# Patient Record
Sex: FEMALE | Race: BLACK OR AFRICAN AMERICAN | Employment: FULL TIME | URBAN - METROPOLITAN AREA
[De-identification: names, ages, dates, MRNs, and addresses within clinical notes are randomized per-mention and may not be internally consistent; named-entity substitution may affect disease eponyms.]

---

## 2017-10-12 LAB
CHLAMYDIA, EXTERNAL: NORMAL
HBSAG, EXTERNAL: NEGATIVE
HBSAG, EXTERNAL: NORMAL
HIV, EXTERNAL: NON REACTIVE
N. GONORRHEA, EXTERNAL: NORMAL
RUBELLA, EXTERNAL: NORMAL
TYPE, ABO & RH, EXTERNAL: NORMAL

## 2017-10-19 LAB
ANTIBODY SCREEN, EXTERNAL: NORMAL
T. PALLIDUM, EXTERNAL: NORMAL

## 2017-11-13 LAB — GRBS, EXTERNAL: NORMAL

## 2017-11-26 ENCOUNTER — HOSPITAL ENCOUNTER (EMERGENCY)
Age: 27
Discharge: HOME | End: 2017-11-26
Attending: OBSTETRICS & GYNECOLOGY | Admitting: OBSTETRICS & GYNECOLOGY
Payer: COMMERCIAL

## 2017-11-26 VITALS
HEART RATE: 119 BPM | BODY MASS INDEX: 38.64 KG/M2 | TEMPERATURE: 99 F | WEIGHT: 210 LBS | DIASTOLIC BLOOD PRESSURE: 67 MMHG | HEIGHT: 62 IN | SYSTOLIC BLOOD PRESSURE: 112 MMHG | RESPIRATION RATE: 16 BRPM | OXYGEN SATURATION: 99 %

## 2017-11-26 PROCEDURE — 59025 FETAL NON-STRESS TEST: CPT

## 2017-11-26 PROCEDURE — 99283 EMERGENCY DEPT VISIT LOW MDM: CPT

## 2017-11-26 NOTE — H&P
EDC:12/10/2017  EGA: 38 weeks, 0 days      Chief Complaint:  contractions. History of Present Illness:  29 y/o  @ 38/0wks (by 3rd trimester US) presents to L&D c/o ctx Q30 minutes. No associated LOF or VB. Excellent FM. Pregnancy complicated by late presentation to care (31wks), class 3 obesity (BMI 41), LOV cyst 7cm, GBS(+)      Patient's Prenatal Care with Doctor of Record Brigid Brunner MD Notable For -    GBS positive RX in labor  Ovarian cyst pregnant LEFT-7cm  High risk pregnancy insufficient PNC-dated by 31 week US   lab screening  Normal pregnancy primigravida          Impression & Recommendations:    Problem # 1:  Labor false > 37 weeks (HDP-373.86) (HWX23-K68.1)  No evidence of labor. Labor precautions reviewed. Indications for return reviewed. Problem # 2:  GBS positive RX in labor (OGP-354.44) (BTQ83-E22.20)  Plan PCN G for prophylaxis in labor. Other Orders:  Sent to L&D (CPT-AdmitF)          Past Medical History:     Reviewed history from 2017 and no changes required:        LSIL 2016, cx bx- neg    Past Surgical History:     Reviewed history from 2013 and no changes required:        wisdom teeth     Family History Summary:      Reviewed history Last on 10/12/2017 and no changes required:2017      General Comments - FH:  Family history transferred to [address] [place] [address]     Social History:     Reviewed history from 2013 and no changes required:        [address] @ [facility]        See HPI    Except as noted in the HPI, the review of systems is negative for General, Breast, , CV, Resp, Endo, MS, Derm, Neuro, Psych, Allergy and Heme.     Allergies      Medications Removed from Medication List        Flowsheet View for Follow-up Visit     Estimated weeks of        gestation:  38 0/7     Weight:  230.6     FHR:   130     Fetal position:  vertex     Cx Dilation:  0     Cx Effacement: 50% Cx Station:  -3     Preceptor:  yonis     Comment:  triage visit false labor, reactive NST, labor precautions reviewed      Vital Signs    Visit Vitals    /67    Pulse (!) 119    Temp 99 °F (37.2 °C)    Resp 16    Ht 5' 2\" (1.575 m)    Wt 95.3 kg (210 lb)    SpO2 99%    BMI 38.41 kg/m2         Height:   63 inches  Weight:  230.6 pounds    BMI:   41.00 inches    Physical Exam     General           General appearance:  no acute distress    Head           Inspection:   normal    Eyes           External:   EOM intact    ENT           Dental:   adequate dentition    Chest           Lungs:  clear to auscultation          Heart:  regular rate and rhythm    Extremeties           Extremeties:  0 edema    Neurological           Reflexes:  2+ and symmetric with no pathological reflexes    Psych           Orientation:  oriented to time, place, and person          Mood:  no appearance of anxiety, depression, or agitation    Lymph           Inguinal:  no inguinal adenopathy    Skin           Inspection:  no rashes, suspicious lesions, or ulcerations    Abdomen           Abdomen:  gravid    Pelvic Exam           EGBUS:  no lesions          Vagina:  normal appearing without lesions or discharge          Uterus:  gravid          Cervix:  no lesions or discharge                  Dilation: : 0                  Effacement:  50%                  Station:  -3                  Presentation:  vertex                  Membranes:  intact    Agricola: rare ctx  FHR: 130s, category 1        Impression & Recommendations:    Problem # 1:  Labor false > 37 weeks (ICD-644.13) (VHE40-X00.1)  No evidence of labor. Labor precautions reviewed. Indications for return reviewed. Problem # 2:  GBS positive RX in labor (QRM-449.47) (JPG95-J45.24)  Plan PCN G for prophylaxis in labor.      Other Orders:  Sent to L&D (CPT-AdmitF)      Medications (at conclusion of this visit)    11/06/2017 SLOW RELEASE IRON TABLET EXTENDED RELEASE (FERROUS SULFATE DRIED CR-TABS)   11/06/2017 PRENATAL TABLET (PRENATAL VIT-FE FUMARATE-FA TABS)           LABORATORY DATA   TEST DATE RESULT   Group B Strep culture 11/13/2017 Positive                                   (Group B Strep Culture Result Field)   Blood Type 10/12/2017 B                                             (Blood Type Result Field)   Rh 10/12/2017 Positive                                   (Rh Result Field)   Rhogam Inj Given     Tdap Vaccine Given 11/06/2017 vacc. elsewhere   Antibody Screen 10/19/2017 Negative   Rubella  [facility] Reference Ranges On or After 3/10/14                  <0.90              Non-immune      0.90 - 0.99     Equivocal      >0.99              Immune    [facility] Reference Ranges  Before 3/10/14           <5                 Non-immune             5 - 9               Equivocal            >9                 Immune  [facility] Reference Ranges       < Or = 0.90       Negative             0.91-1.09          Equivocal            > Or = 1.10       Positive   10/12/2017     7.52     TPA (T Pallidum Antibodies) 10/19/2017 Negative   Serology (RPR)     HBsAg 10/12/2017 Negative   HIV 10/12/2017 Non Reactive   Hemoglobin 10/19/2017 10.9   Hematocrit 10/19/2017 33.1   Platelets 82/63/4101 251 X10E3/UL   TSH     Urine Culture 10/12/2017 Negative   GC DNA Probe 10/12/2017 Negative   Chlamydia DNA 10/12/2017 Negative   PAP 10/12/2017 NIL   Flu Vaccine Given 10/12/2017 vacc.  elsewhere   HGBA1C     HGB Electro     T4, Free     BG Fasting     GTT 1H 50G 10/19/2017 130   GTT 1H 100G     GTT 2H 100G     GTT 3H 100G     Glucose Plasma     CF Accept or Decline 10/12/2017 Declined   CF Screen Result 10/12/2017 Declined   Nuchal Trans 10/12/2017 declined   AFP Only     Tetra     AFP Serum 10/12/2017 declined   CVS 10/12/2017 declined   AFP Amniotic     Amnio Karyo 10/12/2017 declined   FISH     GC Culture     Chlamydia Cult     Ureaplasma     Mycoplasma     WBC 10/12/2017 10.9 X10E3/UL   RBC 10/12/2017 4.26 X10E6/UL   MCV 10/12/2017 79   MCH 10/12/2017 27.0   MCHC RBC 10/12/2017 34.0     ULTRASOUND DATA   TEST DATE RESULT   Estimated Fetal Weight 11/13/2017 2622^7608 g&grams                                     Weight % 11/13/2017 74^74% %&percent                                                VISHNU 11/13/2017 15.77^15.8 cm&centimeters                    BPP 11/13/2017 8^8 [n/a]&Not applicable   Cervical Length (mm)             Electronically signed by Bj Haas MD on 11/26/2017 at 4:54 PM    ________________________________________________________________________    Date of Visit Update:  Jun Polanco was seen and examined. History and physical has been reviewed. The patient has been examined.  There have been no significant clinical changes since the completion of the originally dated History and Physical.    Signed By: Bj Haas MD     November 26, 2017 4:54 PM

## 2017-11-26 NOTE — DISCHARGE INSTRUCTIONS
General Discharge Instructions    Patient ID:  Shailesh Parks  [id number]  28 y.o.  1990    Patient Instructions    Take Home Medications         What to do at Home    Recommended diet: Regular Diet    Recommended activity: Activity as tolerated    Follow-up: Keep scheduled appointment         Week 45 of Your Pregnancy: Care Instructions  Your Care Instructions    Believe it or not, your baby is almost here. You may have ideas about your baby's personality because of how much he or she moves. Or you may have noticed how he or she responds to sounds, warmth, cold, and light. You may even know what kind of music your baby likes. By now, you have a better idea of what to expect during delivery. You may have talked about your birth preferences with your doctor. But even if you want a vaginal birth, it is a good idea to learn about  births.  birth means that your baby is born through a cut (incision) in your lower belly. It is sometimes the best choice for the health of the baby and the mother. This care sheet can help you understand  births. It also gives you information about what to expect after your baby is born. And it helps you understand more about postpartum depression. Follow-up care is a key part of your treatment and safety. Be sure to make and go to all appointments, and call your doctor if you are having problems. It's also a good idea to know your test results and keep a list of the medicines you take. How can you care for yourself at home? Learn about  birth  · Most C-sections are unplanned. They are done because of problems that occur during labor. These problems might include:  ¨ Labor that slows or stops. ¨ High blood pressure or other problems for the mother. ¨ Signs of distress in the baby. These signs may include a very fast or slow heart rate. · Although most mothers and babies do well after , it is major surgery.  It has more risks than a vaginal delivery. · In some cases, a planned  may be safer than a vaginal delivery. This may be the case if:  ¨ The mother has a health problem, such as a heart condition. ¨ The baby isn't in a head-down position for delivery. This is called a breech position. ¨ The uterus has scars from past surgeries. This could increase the chance of a tear in the uterus. ¨ There is a problem with the placenta. ¨ The mother has an infection, such as genital herpes, that could be spread to the baby. ¨ The mother is having twins or more. ¨ The baby weighs 9 to 10 pounds or more. · Because of the risks of , planned C-sections generally should be done only for medical reasons. And a planned  should be done at 39 weeks or later unless there is a medical reason to do it sooner. Know what to expect after delivery, and plan for the first few weeks at home  · You, your baby, and your partner or  will get identification bands. Only people with matching bands can  the baby from the nursery. · You will learn how to feed, diaper, and bathe your baby. And you will learn how to care for the umbilical cord stump. If your baby will be circumcised, you will also learn how to care for that. · Ask people to wait to visit you until you are at home. And ask them to wash their hands before they touch your baby. · Make sure you have another adult in your home for at least 2 or 3 days after the birth. · During the first 2 weeks, limit when friends and family can visit. · Do not allow visitors who have colds or infections. Make sure all visitors are up to date with their vaccinations. Never let anyone smoke around your baby. · Try to nap when the baby naps. Be aware of postpartum depression  · \"Baby blues\" are common for the first 1 to 2 weeks after birth. You may cry or feel sad or irritable for no reason. · For some women, these feelings last longer and are more intense.  This is called postpartum depression. · If your symptoms last for more than a few weeks or you feel very depressed, ask your doctor for help. · Postpartum depression can be treated. Support groups and counseling can help. Sometimes medicine can also help. Where can you learn more? Go to http://bimal-johny.info/. Enter B044 in the search box to learn more about \"Week 38 of Your Pregnancy: Care Instructions. \"  Current as of: March 16, 2017  Content Version: 11.4  © 4925-2984 [employer]. Care instructions adapted under license by [employer] (which disclaims liability or warranty for this information). If you have questions about a medical condition or this instruction, always ask your healthcare professional. [address] any warranty or liability for your use of this information.

## 2017-11-26 NOTE — PROGRESS NOTES
1642: Dr. Janene Tineo in room, SVE done. 11/26/17 1642   Cervical Exam   Dilation (cm) 0     Will discharge pt home.

## 2017-11-26 NOTE — ROUTINE PROCESS
[facility] Nov 30th, Pt in c/o contractions now every 10-15 mins apart. Denies leaking, denies bleeding.  Gr 1

## 2017-11-26 NOTE — IP AVS SNAPSHOT
[address] [facility] 
[phone] Patient: Rafael Navarro MRN: [id number] XCT:[id number] About your hospitalization You were admitted on:  N/A You last received care in the:  [facility] 3 LD TRIAGE You were discharged on:  2017 Why you were hospitalized Your primary diagnosis was:  Not on File Discharge Orders None A check geo indicates which time of day the medication should be taken. My Medications ASK your physician about these medications Instructions Each Dose to Equal  
 Morning Noon Evening Bedtime PNV No12-Iron-FA-DSS-OM-3 29 mg iron-1 mg -50 mg Cpkd Your last dose was: Your next dose is: Take  by mouth. SLOW  mg (45 mg iron) ER tablet Generic drug:  ferrous sulfate Your last dose was: Your next dose is: Take  by mouth Daily (before breakfast). Discharge Instructions General Discharge Instructions Patient ID: 
Rafael Navarro 
[id number] 
89 y.o. 
1990 Patient Instructions Take Home Medications What to do at [facility] Recommended diet: Regular Diet Recommended activity: Activity as tolerated Follow-up: Keep scheduled appointment Week 38 of Your Pregnancy: Care Instructions Your Care Instructions Believe it or not, your baby is almost here. You may have ideas about your baby's personality because of how much he or she moves. Or you may have noticed how he or she responds to sounds, warmth, cold, and light. You may even know what kind of music your baby likes. By now, you have a better idea of what to expect during delivery. You may have talked about your birth preferences with your doctor. But even if you want a vaginal birth, it is a good idea to learn about  births.  birth means that your baby is born through a cut (incision) in your lower belly. It is sometimes the best choice for the health of the baby and the mother. This care sheet can help you understand  births. It also gives you information about what to expect after your baby is born. And it helps you understand more about postpartum depression. Follow-up care is a key part of your treatment and safety. Be sure to make and go to all appointments, and call your doctor if you are having problems. It's also a good idea to know your test results and keep a list of the medicines you take. How can you care for yourself at home? Learn about  birth · Most C-sections are unplanned. They are done because of problems that occur during labor. These problems might include: 
¨ Labor that slows or stops. ¨ High blood pressure or other problems for the mother. ¨ Signs of distress in the baby. These signs may include a very fast or slow heart rate. · Although most mothers and babies do well after , it is major surgery. It has more risks than a vaginal delivery. · In some cases, a planned  may be safer than a vaginal delivery. This may be the case if: ¨ The mother has a health problem, such as a heart condition. ¨ The baby isn't in a head-down position for delivery. This is called a breech position. ¨ The uterus has scars from past surgeries. This could increase the chance of a tear in the uterus. ¨ There is a problem with the placenta. ¨ The mother has an infection, such as genital herpes, that could be spread to the baby. ¨ The mother is having twins or more. ¨ The baby weighs 9 to 10 pounds or more. · Because of the risks of , planned C-sections generally should be done only for medical reasons. And a planned  should be done at 39 weeks or later unless there is a medical reason to do it sooner. Know what to expect after delivery, and plan for the first few weeks at home · You, your baby, and your partner or  will get identification bands. Only people with matching bands can  the baby from the nursery. · You will learn how to feed, diaper, and bathe your baby. And you will learn how to care for the umbilical cord stump. If your baby will be circumcised, you will also learn how to care for that. · Ask people to wait to visit you until you are at home. And ask them to wash their hands before they touch your baby. · Make sure you have another adult in your home for at least 2 or 3 days after the birth. · During the first 2 weeks, limit when friends and family can visit. · Do not allow visitors who have colds or infections. Make sure all visitors are up to date with their vaccinations. Never let anyone smoke around your baby. · Try to nap when the baby naps. Be aware of postpartum depression · \"Baby blues\" are common for the first 1 to 2 weeks after birth. You may cry or feel sad or irritable for no reason. · For some women, these feelings last longer and are more intense. This is called postpartum depression. · If your symptoms last for more than a few weeks or you feel very depressed, ask your doctor for help. · Postpartum depression can be treated. Support groups and counseling can help. Sometimes medicine can also help. Where can you learn more? Go to http://bimal-johny.info/. Enter B044 in the search box to learn more about \"Week 38 of Your Pregnancy: Care Instructions. \" Current as of: March 16, 2017 Content Version: 11.4 © 1571-2433 [employer]. Care instructions adapted under license by [employer] (which disclaims liability or warranty for this information).  If you have questions about a medical condition or this instruction, always ask your healthcare professional. [employer] disclaims any warranty or liability for your use of this information. Introducing [facility]! Romayne Duster introduces Easy Vino patient portal. Now you can access parts of your medical record, email your doctor's office, and request medication refills online. 1. In your internet browser, go to [email or web address] 2. Click on the First Time User? Click Here link in the Sign In box. You will see the New Member Sign Up page. 3. Enter your Easy Vino Access Code exactly as it appears below. You will not need to use this code after youve completed the sign-up process. If you do not sign up before the expiration date, you must request a new code. · Easy Vino Access Code: [email or web address] Expires: 2/24/2018  4:48 PM 
 
4. Enter the last four digits of your Social Security Number (xxxx) and Date of Birth (mm/dd/yyyy) as indicated and click Submit. You will be taken to the next sign-up page. 5. Create a Easy Vino ID. This will be your Easy Vino login ID and cannot be changed, so think of one that is secure and easy to remember. 6. Create a Easy Vino password. You can change your password at any time. 7. Enter your Password Reset Question and Answer. This can be used at a later time if you forget your password. 8. Enter your e-mail address. You will receive e-mail notification when new information is available in [address] 9. Click Sign Up. You can now view and download portions of your medical record. 10. Click the Download Summary menu link to download a portable copy of your medical information. If you have questions, please visit the Frequently Asked Questions section of the Easy Vino website. Remember, Easy Vino is NOT to be used for urgent needs. For medical emergencies, dial 911. Now available from your iPhone and Android! Providers Seen During Your Hospitalization Provider Specialty Primary office phone Kenyetta Bailey MD Obstetrics & Gynecology [phone] Your Primary Care Physician (PCP) Primary Care Physician Office Phone Office Fax Deena Sheikh [phone] You are allergic to the following No active allergies Recent Documentation Height Weight BMI OB Status Smoking Status 1.575 m 95.3 kg 38.41 kg/m2 Pregnant Never Smoker Emergency Contacts Name Discharge Info Relation Home Work Mobile [address]al CAREGIVER [3] Parent [1] [phone] Vanna Zapata  Parent [1] [phone] Patient Belongings The following personal items are in your possession at time of discharge: 
                             
 
  
  
 Please provide this summary of care documentation to your next provider. Signatures-by signing, you are acknowledging that this After Visit Summary has been reviewed with you and you have received a copy. Patient Signature:  ____________________________________________________________ Date:  ____________________________________________________________  
  
[facility] Provider Signature:  ____________________________________________________________ Date:  ____________________________________________________________

## 2017-12-14 ENCOUNTER — ANESTHESIA EVENT (OUTPATIENT)
Dept: LABOR AND DELIVERY | Age: 27
End: 2017-12-14
Payer: COMMERCIAL

## 2017-12-14 ENCOUNTER — HOSPITAL ENCOUNTER (INPATIENT)
Age: 27
LOS: 4 days | Discharge: HOME | End: 2017-12-18
Attending: OBSTETRICS & GYNECOLOGY | Admitting: OBSTETRICS & GYNECOLOGY
Payer: COMMERCIAL

## 2017-12-14 ENCOUNTER — ANESTHESIA (OUTPATIENT)
Dept: LABOR AND DELIVERY | Age: 27
End: 2017-12-14
Payer: COMMERCIAL

## 2017-12-14 PROBLEM — O40.3XX0 POLYHYDRAMNIOS AFFECTING PREGNANCY IN THIRD TRIMESTER: Status: ACTIVE | Noted: 2017-12-14

## 2017-12-14 LAB
ERYTHROCYTE [DISTWIDTH] IN BLOOD BY AUTOMATED COUNT: 15.8 % (ref 11.5–14.5)
HCT VFR BLD AUTO: 38.2 % (ref 35–47)
HGB BLD-MCNC: 12.9 G/DL (ref 11.5–16)
MCH RBC QN AUTO: 27.8 PG (ref 26–34)
MCHC RBC AUTO-ENTMCNC: 33.8 G/DL (ref 30–36.5)
MCV RBC AUTO: 82.3 FL (ref 80–99)
PLATELET # BLD AUTO: 243 K/UL (ref 150–400)
RBC # BLD AUTO: 4.64 M/UL (ref 3.8–5.2)
WBC # BLD AUTO: 11.4 K/UL (ref 3.6–11)

## 2017-12-14 PROCEDURE — 75410000002 HC LABOR FEE PER 1 HR

## 2017-12-14 PROCEDURE — 85027 COMPLETE CBC AUTOMATED: CPT | Performed by: OBSTETRICS & GYNECOLOGY

## 2017-12-14 PROCEDURE — 36415 COLL VENOUS BLD VENIPUNCTURE: CPT | Performed by: OBSTETRICS & GYNECOLOGY

## 2017-12-14 PROCEDURE — 76060000078 HC EPIDURAL ANESTHESIA

## 2017-12-14 PROCEDURE — 65410000002 HC RM PRIVATE OB

## 2017-12-14 PROCEDURE — 74011250636 HC RX REV CODE- 250/636: Performed by: OBSTETRICS & GYNECOLOGY

## 2017-12-14 PROCEDURE — 74011000258 HC RX REV CODE- 258: Performed by: OBSTETRICS & GYNECOLOGY

## 2017-12-14 PROCEDURE — 77030034849

## 2017-12-14 PROCEDURE — 77030014125 HC TY EPDRL BBMI -B: Performed by: ANESTHESIOLOGY

## 2017-12-14 PROCEDURE — 00HU33Z INSERTION OF INFUSION DEVICE INTO SPINAL CANAL, PERCUTANEOUS APPROACH: ICD-10-PCS | Performed by: ANESTHESIOLOGY

## 2017-12-14 PROCEDURE — 77030007880 HC KT SPN EPDRL BBMI -B

## 2017-12-14 PROCEDURE — 74011250636 HC RX REV CODE- 250/636

## 2017-12-14 PROCEDURE — 3E033VJ INTRODUCTION OF OTHER HORMONE INTO PERIPHERAL VEIN, PERCUTANEOUS APPROACH: ICD-10-PCS | Performed by: OBSTETRICS & GYNECOLOGY

## 2017-12-14 PROCEDURE — 3E0R3BZ INTRODUCTION OF ANESTHETIC AGENT INTO SPINAL CANAL, PERCUTANEOUS APPROACH: ICD-10-PCS | Performed by: ANESTHESIOLOGY

## 2017-12-14 RX ORDER — NALOXONE HYDROCHLORIDE 0.4 MG/ML
0.4 INJECTION, SOLUTION INTRAMUSCULAR; INTRAVENOUS; SUBCUTANEOUS AS NEEDED
Status: DISCONTINUED | OUTPATIENT
Start: 2017-12-14 | End: 2017-12-15 | Stop reason: HOSPADM

## 2017-12-14 RX ORDER — EPHEDRINE SULFATE 50 MG/ML
12.5 INJECTION, SOLUTION INTRAVENOUS
Status: DISCONTINUED | OUTPATIENT
Start: 2017-12-14 | End: 2017-12-15 | Stop reason: HOSPADM

## 2017-12-14 RX ORDER — SODIUM CHLORIDE 0.9 % (FLUSH) 0.9 %
5-10 SYRINGE (ML) INJECTION EVERY 8 HOURS
Status: DISCONTINUED | OUTPATIENT
Start: 2017-12-14 | End: 2017-12-15 | Stop reason: SDUPTHER

## 2017-12-14 RX ORDER — FENTANYL/BUPIVACAINE/NS/PF 2-1250MCG
1-16 PREFILLED PUMP RESERVOIR EPIDURAL CONTINUOUS
Status: DISCONTINUED | OUTPATIENT
Start: 2017-12-14 | End: 2017-12-15 | Stop reason: HOSPADM

## 2017-12-14 RX ORDER — BUPIVACAINE HYDROCHLORIDE AND EPINEPHRINE 2.5; 5 MG/ML; UG/ML
INJECTION, SOLUTION EPIDURAL; INFILTRATION; INTRACAUDAL; PERINEURAL AS NEEDED
Status: DISCONTINUED | OUTPATIENT
Start: 2017-12-14 | End: 2017-12-15 | Stop reason: HOSPADM

## 2017-12-14 RX ORDER — SODIUM CHLORIDE 0.9 % (FLUSH) 0.9 %
5-10 SYRINGE (ML) INJECTION AS NEEDED
Status: DISCONTINUED | OUTPATIENT
Start: 2017-12-14 | End: 2017-12-15 | Stop reason: SDUPTHER

## 2017-12-14 RX ORDER — NALBUPHINE HYDROCHLORIDE 10 MG/ML
10 INJECTION, SOLUTION INTRAMUSCULAR; INTRAVENOUS; SUBCUTANEOUS
Status: DISCONTINUED | OUTPATIENT
Start: 2017-12-14 | End: 2017-12-15 | Stop reason: HOSPADM

## 2017-12-14 RX ORDER — FENTANYL/BUPIVACAINE/NS/PF 2-1250MCG
PREFILLED PUMP RESERVOIR EPIDURAL
Status: COMPLETED
Start: 2017-12-14 | End: 2017-12-14

## 2017-12-14 RX ORDER — FENTANYL CITRATE 50 UG/ML
INJECTION, SOLUTION INTRAMUSCULAR; INTRAVENOUS AS NEEDED
Status: DISCONTINUED | OUTPATIENT
Start: 2017-12-14 | End: 2017-12-15 | Stop reason: HOSPADM

## 2017-12-14 RX ORDER — OXYTOCIN IN 5 % DEXTROSE 30/500 ML
1-25 PLASTIC BAG, INJECTION (ML) INTRAVENOUS
Status: DISCONTINUED | OUTPATIENT
Start: 2017-12-15 | End: 2017-12-18 | Stop reason: ALTCHOICE

## 2017-12-14 RX ORDER — SODIUM CHLORIDE, SODIUM LACTATE, POTASSIUM CHLORIDE, CALCIUM CHLORIDE 600; 310; 30; 20 MG/100ML; MG/100ML; MG/100ML; MG/100ML
125 INJECTION, SOLUTION INTRAVENOUS CONTINUOUS
Status: DISCONTINUED | OUTPATIENT
Start: 2017-12-14 | End: 2017-12-16

## 2017-12-14 RX ORDER — SODIUM CHLORIDE 0.9 % (FLUSH) 0.9 %
5-10 SYRINGE (ML) INJECTION AS NEEDED
Status: DISCONTINUED | OUTPATIENT
Start: 2017-12-14 | End: 2017-12-18 | Stop reason: HOSPADM

## 2017-12-14 RX ORDER — BUPIVACAINE HYDROCHLORIDE AND EPINEPHRINE 2.5; 5 MG/ML; UG/ML
INJECTION, SOLUTION EPIDURAL; INFILTRATION; INTRACAUDAL; PERINEURAL
Status: DISPENSED
Start: 2017-12-14 | End: 2017-12-15

## 2017-12-14 RX ORDER — ONDANSETRON 2 MG/ML
4 INJECTION INTRAMUSCULAR; INTRAVENOUS
Status: DISCONTINUED | OUTPATIENT
Start: 2017-12-14 | End: 2017-12-18 | Stop reason: HOSPADM

## 2017-12-14 RX ORDER — FENTANYL CITRATE 50 UG/ML
INJECTION, SOLUTION INTRAMUSCULAR; INTRAVENOUS
Status: DISPENSED
Start: 2017-12-14 | End: 2017-12-15

## 2017-12-14 RX ADMIN — SODIUM CHLORIDE 5 MILLION UNITS: 900 INJECTION, SOLUTION INTRAVENOUS at 20:20

## 2017-12-14 RX ADMIN — FENTANYL CITRATE 100 MCG: 50 INJECTION, SOLUTION INTRAMUSCULAR; INTRAVENOUS at 21:05

## 2017-12-14 RX ADMIN — BUPIVACAINE HYDROCHLORIDE AND EPINEPHRINE 0.8 ML: 2.5; 5 INJECTION, SOLUTION EPIDURAL; INFILTRATION; INTRACAUDAL; PERINEURAL at 21:02

## 2017-12-14 RX ADMIN — NALBUPHINE HYDROCHLORIDE 10 MG: 10 INJECTION, SOLUTION INTRAMUSCULAR; INTRAVENOUS; SUBCUTANEOUS at 17:45

## 2017-12-14 RX ADMIN — Medication 10 ML: at 17:46

## 2017-12-14 RX ADMIN — BUPIVACAINE HYDROCHLORIDE AND EPINEPHRINE 3 ML: 2.5; 5 INJECTION, SOLUTION EPIDURAL; INFILTRATION; INTRACAUDAL; PERINEURAL at 21:03

## 2017-12-14 RX ADMIN — SODIUM CHLORIDE, SODIUM LACTATE, POTASSIUM CHLORIDE, AND CALCIUM CHLORIDE 999 ML/HR: 600; 310; 30; 20 INJECTION, SOLUTION INTRAVENOUS at 20:10

## 2017-12-14 RX ADMIN — BUPIVACAINE HYDROCHLORIDE AND EPINEPHRINE 3 ML: 2.5; 5 INJECTION, SOLUTION EPIDURAL; INFILTRATION; INTRACAUDAL; PERINEURAL at 21:05

## 2017-12-14 RX ADMIN — SODIUM CHLORIDE, SODIUM LACTATE, POTASSIUM CHLORIDE, AND CALCIUM CHLORIDE 500 ML/HR: 600; 310; 30; 20 INJECTION, SOLUTION INTRAVENOUS at 20:41

## 2017-12-14 RX ADMIN — FENTANYL 0.2 MG/100ML-BUPIV 0.125%-NACL 0.9% EPIDURAL INJ 12 ML/HR: 2/0.125 SOLUTION at 21:26

## 2017-12-14 NOTE — H&P
Patient seen and examined on arrival  Admitted for scheudled IOL secondary to polyhydramnios, obesity at term   Discussed options for cervical ripening including  Cervidil, cytotec, or intracervical balloon. Decision made to proceed with intracervical balloon for ripening   Category 1 fetal tracing    Reactive NST   Irregular contractions  SVE  70/1/-3 vertex. Ramos balloon placed through internal cervical os. Inflated with 60cc sterile water. Proper placement confirmed.  Tolerated procedure well   Reassuring fetal surveillance throughout   GBS+   Penicillin per protocol        EDC:12/10/2017  EGA: 40 weeks, 4 days      Vital Signs   28Years Old Female  Weight: 240 pounds  BP:       120/86  Hospital of delivery: [address]    Pap/HPV/Gardasil History   History of abnormal pap: no  Gardasil Injection History: Complete    Patient's Prenatal Care with Doctor of Record Jonathan Sarmiento MD Notable For -    Labor false > 37 weeks  GBS positive RX in labor  Ovarian cyst pregnant LEFT-7cm  High risk pregnancy insufficient PNC-dated by 31 week US   lab screening  Normal pregnancy primigravida                  Allergies      Medications Removed from Medication List        [address] [place] [address] for Follow-up Visit     Estimated weeks of        gestation:  40 4/7     Weight:  240     Blood pressure: 120 / 86     Urine Protein:  Tr     Urine Glucose: N     Headache:  No     Nausea/vomiting: No     Edema:  0     Vaginal bleeding: no     Vaginal discharge: d/c     Fetal activity:  yes     FHR:   u/s 138     Labor symptoms: few ctx     Fetal position:  vertex     Cx Dilation:  1     Cx Effacement: 70%     Cx Station:  -3     Preceptor:  kg     Zika:  denies exposure     Comment:  contractions and low back pain, Ultrasound- vtx, EFW 8lb3oz, BPP 8/8, VISHNU=24, L cyst 6 cm, active FM, Reviewed with Dr Jacque Bucio and she recommends induction due to polyhydramnios        Impression & Recommendations:    Problem # 1:  Polyhydramnios ____ (ICD-657.03) (NAG80-D10.3xx0)  Induction due to polyhydramnois per Dr Funmilayo Almanza  admit to L&D tonight for cervical ripening, Pitocin in am  cbc, STBB  CEFM    Problem # 2:  Ovarian cyst pregnant LEFT-6cm (WUQ-890.31) (VTZ81-L02.29)  If patient needs  would evaluate ovary at that time    Other Orders:  Antepartum Care (CPT-10)      Medications (at conclusion of this visit)    2017 SLOW RELEASE IRON TABLET EXTENDED RELEASE (FERROUS SULFATE DRIED CR-TABS)   2017 PRENATAL TABLET (PRENATAL VIT-FE FUMARATE-FA TABS)           Primary Provider:  Lino Bianchi MD      History of Present Illness:  G1 40 4/7 weeks dated by 31 week Ultrasound with Ultrasound today showing polyhydraminios.    Ultrasound- vtx, EFW 8lb3oz, BPP 8/, VISHNU=24  She has had a complex L ovarian cyst that has been stable at 6 cm- c/w possible dermoid  Pregnancy c/b delayed start to prenatal care, normal gtt        Past Medical History:     Reviewed history from 2017 and no changes required:        LSIL 2016, cx bx- neg    Past Surgical History:     Reviewed history from 2013 and no changes required:        wisdom teeth     Family History Summary:      Reviewed history Last on 2017 and no changes required:2017      General Comments - FH:  Family history transferred to [address] [place] [address]     Social History:     Reviewed history from 2013 and no changes required:        [address] @ [place]           Vital Signs    Blood Pressure: 120 / 86    Weight:  240 pounds                    Dilation: : 1                  Effacement:  70%                  Station:  -3                  Presentation:  vertex    Allergies      Medications Removed from Medication List        Impression & Recommendations:    Problem # 1:  Polyhydramnios ____ (ICD-657.03) (RRC98-A51.3xx0)  Induction due to polyhydramnois per Dr Funmilayo Almanza  admit to L&D tonight for cervical ripening, Pitocin in am  cbc, STBB  CEFM    Problem # 2:  Ovarian cyst pregnant LEFT-6cm (SAMIR-499.21) (XFQ73-R71.83)  If patient needs  would evaluate ovary at that time    Other Orders:  Antepartum Care (CPT-10)      Medications (at conclusion of this visit)    2017 SLOW RELEASE IRON TABLET EXTENDED RELEASE (FERROUS SULFATE DRIED CR-TABS)   2017 PRENATAL TABLET (PRENATAL VIT-FE FUMARATE-FA TABS)           LABORATORY DATA   TEST DATE RESULT   Group B Strep culture 2017 Positive                                   (Group B Strep Culture Result Field)   Blood Type 10/12/2017 B                                             (Blood Type Result Field)   Rh 10/12/2017 Positive                                   (Rh Result Field)   Rhogam Inj Given     Tdap Vaccine Given 2017 vacc. elsewhere   Antibody Screen 10/19/2017 Negative   Rubella  [facility] Reference Ranges On or After 3/10/14                  <0.90              Non-immune      0.90 - 0.99     Equivocal      >0.99              Immune    [facility] Reference Ranges  Before 3/10/14           <5                 Non-immune             5 - 9               Equivocal            >9                 Immune  [facility] Reference Ranges       < Or = 0.90       Negative             0.91-1.09          Equivocal            > Or = 1.10       Positive   10/12/2017     7.52     TPA (T Pallidum Antibodies) 10/19/2017 Negative   Serology (RPR)     HBsAg 10/12/2017 Negative   HIV 10/12/2017 Non Reactive   Hemoglobin 10/19/2017 10.9   Hematocrit 10/19/2017 33.1   Platelets  251 X10E3/UL   TSH     Urine Culture 10/12/2017 Negative   GC DNA Probe 10/12/2017 Negative   Chlamydia DNA 10/12/2017 Negative   PAP 10/12/2017 NIL   Flu Vaccine Given 10/12/2017 vacc.  elsewhere   HGBA1C     HGB Electro     T4, Free     BG Fasting     GTT 1H 50G 10/19/2017 130   GTT 1H 100G     GTT 2H 100G     GTT 3H 100G     Glucose Plasma     CF Accept or Decline 10/12/2017 Declined   CF Screen Result 10/12/2017 Declined   Nuchal Trans 10/12/2017 declined   AFP Only     Tetra     AFP Serum 10/12/2017 declined   CVS 10/12/2017 declined   AFP Amniotic     Amnio Karyo 10/12/2017 declined   FISH     GC Culture     Chlamydia Cult     Ureaplasma     Mycoplasma     WBC 10/12/2017 10.9 X10E3/UL   RBC 10/12/2017 4.26 X10E6/UL   MCV 10/12/2017 79   MCH 10/12/2017 27.0   MCHC RBC 10/12/2017 34.0     ULTRASOUND DATA   TEST DATE RESULT   Estimated Fetal Weight 11/13/2017 6299^0935 g&grams                                     Weight % 11/13/2017 74^74% %&percent                                                VISHNU 11/13/2017 15.77^15.8 cm&centimeters                    BPP 11/13/2017 8^8 [n/a]&Not applicable   Cervical Length (mm)       ]      Electronically signed by Gina Quintana MD on 12/14/2017 at 11:56 AM    ________________________________________________________________________

## 2017-12-14 NOTE — IP AVS SNAPSHOT
[address] [facility] 
[phone] Patient: Loren Casillas MRN: [id number] X:0/1/6458 About your hospitalization You were admitted on:  December 14, 2017 You last received care in the:  [facility] You were discharged on:  December 18, 2017 Why you were hospitalized Your primary diagnosis was:  Not on File Your diagnoses also included:  Polyhydramnios Affecting Pregnancy In Third Trimester Things You Need To Do (next 8 weeks) Follow up with Rod Dominguez MD  
  
Phone:  [phone] Where:  [address] [address], [address] [address] Discharge Orders None A check geo indicates which time of day the medication should be taken. My Medications TAKE these medications as instructed Instructions Each Dose to Equal  
 Morning Noon Evening Bedtime  
 ibuprofen 800 mg tablet Commonly known as:  MOTRIN Your last dose was: Your next dose is: Take 1 Tab by mouth every eight (8) hours. 800 mg  
    
   
   
   
  
 oxyCODONE-acetaminophen 5-325 mg per tablet Commonly known as:  PERCOCET Your last dose was: Your next dose is: Take 1 Tab by mouth every four (4) hours as needed. Max Daily Amount: 6 Tabs. 1 Tab PNV No12-Iron-FA-DSS-OM-3 29 mg iron-1 mg -50 mg Cpkd Your last dose was: Your next dose is: Take  by mouth. SLOW  mg (45 mg iron) ER tablet Generic drug:  ferrous sulfate Your last dose was: Your next dose is: Take  by mouth Daily (before breakfast). Where to Get Your Medications Information on where to get these meds will be given to you by the nurse or doctor. ! Ask your nurse or doctor about these medications ibuprofen 800 mg tablet  
 oxyCODONE-acetaminophen 5-325 mg per tablet Discharge Instructions  Delivery (Postpartum Care): After Your Visit Your Care Instructions Your baby was delivered through a cut, called an incision, in your belly. This surgery is called a  delivery, or a . After childbirth (postpartum period), your body goes through many changes. In the next few weeks, your body will slowly heal. It can take 4 weeks or more for the incision from your surgery to heal. It is easy to get too tired and overwhelmed during the first weeks after your baby is born. You may feel emotional during this time. Changes in your hormones can shift your mood without warning. It is easy to get too tired and overwhelmed during the first weeks after childbirth. Take it easy on yourself. You may find it hard to meet the extra demands on your energy and time. Get rest whenever you can, accept help from others, and eat well and drink plenty of fluids. After a , you may have gas or need to burp a lot. You may have some light vaginal bleeding or spotting for up to 6 weeks after surgery. It is normal to have pain in the incision area off and on during the next 6 months. No driving for 1 week after delivery. No heavy lifting. No more than 8 lbs or the size of baby for 2-3 weeks. Limit use of stairs. 1-2 times per day for the first week after delivery. Follow-up care is a key part of your treatment and safety. Be sure to make and go to all appointments, and call your doctor if you are having problems. Its also a good idea to know your test results and keep a list of the medicines you take. Around 4 to 6 weeks after your baby's birth, you will have a follow-up visit with your doctor. This visit is your time to talk to your doctor about anything you are concerned or curious about. Keep a list of questions to bring to your postpartum visit.  Your questions might be about: 
Changes in your breasts, such as lumps or soreness. When to expect your menstrual period to start again. What form of birth control is best for you. Weight you have put on during the pregnancy. Exercise options. What foods and drinks are best for you, especially if you are breast-feeding. Problems you might be having with breast-feeding. When you can have sex. Some women may want to talk about lubricants for the vagina. Any feelings of sadness or restlessness that you are having. How can you care for yourself at home? Be sure that you understand any instructions your doctor has given you after surgery. This will guide your activities and tell you what to watch for in the next few weeks. Vaginal bleeding and cramps After delivery, you will have a bloody discharge from the vagina. This will turn pink within a week and then white or yellow after about 10 days. It may last for 2 to 4 weeks or longer, until the uterus has healed. You may have cramps for the first few days after childbirth. These are normal and occur as the uterus shrinks to normal size. Take an over-the-counter pain medicine, such as acetaminophen (Tylenol), ibuprofen (Advil, Motrin), or naproxen (Aleve), for cramps. Read and follow all instructions on the label. Do not take aspirin, because it can cause more bleeding. Take other medicines exactly as prescribed. Call your doctor if you have any problems with your medicine. Incision You may have had staples removed while you were in the hospital. Keep the area clean, and wash it with water and mild soap. If you had stitches, they should dissolve on their own and should not need to be removed. Follow your doctor's instructions for cleaning the stitched area. If you have steri-strips (tape) the will come off on their own in 7-10 days. Breast-feeding and breast fullness Breast-feed your baby in positions that do not put pressure on your incision, such as side-lying or football-hold positions. Ask your nurse, doctor, midwife, or lactation specialist to show you these positions if you do not know what they are. Your breasts may overfill (engorge) in the first few days after delivery. To help milk flow and to relieve pain, warm your breasts in the shower or by using warm, moist towels before nursing. Express a small amount of milk to soften your breast near the nipple and then feed your baby. Put an ice or cold pack on your breast for a few minutes after nursing to reduce swelling and pain. Put a thin cloth between the ice pack and your skin. If you are not nursing, do not put warmth on your breasts or touch your breasts. Wear a tight bra or sports bra, and use an ice or cold pack on your breasts to reduce swelling and pain. Breast fullness usually lasts 3 to 4 days. Activity Eat a balanced diet. Do not try to lose weight by cutting calories. Keep taking your prenatal vitamins, or take a multivitamin. Get as much rest as you can. Try to take naps when your baby sleeps during the day. Get help with household chores from family or friends, if you can. Do not try to do it all yourself. Get some gentle exercise, such as walking, every day. Do not lift more than 10 pounds for the next 4 to 6 weeks. Do not have sex or use tampons until you have stopped bleeding and at least 4 to 6 weeks have passed since you gave birth. Talk to your doctor about birth control. You can get pregnant even before your period returns. Also, you can get pregnant while you are breast-feeding. Mental health It is normal to have some sadness, anxiety, sleeplessness, and mood swings after you go home. If you feel upset or hopeless for more than a few days, talk to your doctor. If you have any thoughts of hurting yourself or anyone elseincluding your babycall 911 or go to the emergency room.  
Many women get the \"baby blues\" during the first few days after childbirth. The \"baby blues\" usually peak around the fourth day and then ease up in less than 2 weeks. If you have the \"baby blues\" for more than a few days, or if you have thoughts of hurting yourself or your baby, call your doctor right away. Constipation and hemorrhoids Drink plenty of fluids (8 to 10 glasses a day), especially water. · Eat plenty of fiber each day to avoid constipation. Include foods such as whole-grain breads and cereals, raw vegetables, raw and dried fruits, and beans. · If you have hemorrhoids or swelling or pain around the opening of your vagina, try using cold and heat. You can put ice or a cold pack on the area for 10 to 20 minutes at a time. Put a thin cloth between the ice and your skin. Also try sitting in a few inches of warm water (sitz bath) 3 times a day and after bowel movements. Drink plenty of fluids, enough so that your urine is light yellow or clear like water. If you have kidney, heart, or liver disease and have to limit fluids, talk with your doctor before you increase the amount of fluids you drink. When should you call for help? Call 911 anytime you think you may need emergency care. For example, call if: 
You are thinking of hurting yourself, your baby, or anyone else. You have sudden, severe pain in your belly. You pass out (lose consciousness). Call your doctor now or seek immediate medical care if: 
You have severe vaginal bleeding. You are passing blood clots and soaking through a pad each hour for 2 or more hours. Your vaginal bleeding seems to be getting heavier or is still bright red 4 days after delivery, or you pass blood clots larger than the size of a golf ball. You have increased redness, heat, or drainage in the incision area. You are dizzy or lightheaded, or you feel like you may faint. You are vomiting or cannot keep fluids down. You have a fever. You have new or more belly pain. You pass tissue (not just blood). The incision seems to be pulling open or starts bleeding. Your vaginal discharge smells bad. Your belly feels more tender or full and hard. You have pain or redness in one or both breasts. You feel sad, anxious, or hopeless for more than a few days. Where can you learn more? Go to Sealed.be Enter D189 in the search box to learn more about \" Delivery: After Your Visit\". or 
  
Go to Sealed.be Enter L515  in the search box to learn more about \"Postpartum Care: After Your Visit\". This care instruction is for use with your licensed healthcare professional. If you have questions about a medical condition or this instruction, always ask your healthcare professional. Care instructions adapted by [employer] (which disclaims liability or warranty for this information) from Yoandy Lang, [address] [place] [address] . [employer] disclaims any warranty or liability for your use of this information. Acunu Activation Thank you for requesting access to Acunu. Please follow the instructions below to securely access and download your online medical record. Acunu allows you to send messages to your doctor, view your test results, renew your prescriptions, schedule appointments, and more. How Do I Sign Up? 1. In your internet browser, go to [email or web address] 
2. Click on the First Time User? Click Here link in the Sign In box. You will be redirect to the New Member Sign Up page. 3. Enter your Acunu Access Code exactly as it appears below. You will not need to use this code after youve completed the sign-up process. If you do not sign up before the expiration date, you must request a new code. Acunu Access Code: [email or web address] Expires: 2018  4:48 PM (This is the date your Acunu access code will ) 4.  Enter the last four digits of your Social Security Number (xxxx) and Date of Birth (mm/dd/yyyy) as indicated and click Submit. You will be taken to the next sign-up page. 5. Create a F[employer] ID. This will be your [employer] login ID and cannot be changed, so think of one that is secure and easy to remember. 6. Create a [employer] password. You can change your password at any time. 7. Enter your Password Reset Question and Answer. This can be used at a later time if you forget your password. 8. Enter your e-mail address. You will receive e-mail notification when new information is available in [address] 9. Click Sign Up. You can now view and download portions of your medical record. 10. Click the Download Summary menu link to download a portable copy of your medical information. Additional Information If you have questions, please visit the Frequently Asked Questions section of the [employer] website at https://[employer]. [employer]/. Remember, [employer] is NOT to be used for urgent needs. For medical emergencies, dial 911. [employer] Announcement We are excited to announce that we are making your provider's discharge notes available to you in [employer]. You will see these notes when they are completed and signed by the physician that discharged you from your recent hospital stay. If you have any questions or concerns about any information you see in Fuse [employer], please call the Health Information Department where you were seen or reach out to your Primary Care Provider for more information about your plan of care. Introducing [facility]! Sathya Pinzon introduces [employer] Science patient portal. Now you can access parts of your medical record, email your doctor's office, and request medication refills online. 1. In your internet browser, go to https://[employer]. [employer] 2. Click on the First Time User? Click Here link in the Sign In box. You will see the New Member Sign Up page. 3. Enter your Pingup Access Code exactly as it appears below. You will not need to use this code after youve completed the sign-up process. If you do not sign up before the expiration date, you must request a new code. · Pingup Access Code: [email or web address] Expires: 2/24/2018  4:48 PM 
 
4. Enter the last four digits of your Social Security Number (xxxx) and Date of Birth (mm/dd/yyyy) as indicated and click Submit. You will be taken to the next sign-up page. 5. Create a airpimt ID. This will be your Pingup login ID and cannot be changed, so think of one that is secure and easy to remember. 6. Create a Pingup password. You can change your password at any time. 7. Enter your Password Reset Question and Answer. This can be used at a later time if you forget your password. 8. Enter your e-mail address. You will receive e-mail notification when new information is available in [address] 9. Click Sign Up. You can now view and download portions of your medical record. 10. Click the Download Summary menu link to download a portable copy of your medical information. If you have questions, please visit the Frequently Asked Questions section of the Pingup website. Remember, Pingup is NOT to be used for urgent needs. For medical emergencies, dial 911. Now available from your iPhone and Android! Providers Seen During Your Hospitalization Provider Specialty Primary office phone Hemalatha Daniels MD Obstetrics & Gynecology [phone] Your Primary Care Physician (PCP) Primary Care Physician Office Phone Office Fax Fortino Scott [phone] You are allergic to the following No active allergies Recent Documentation Height Weight Breastfeeding? BMI OB Status Smoking Status 1.575 m 83.9 kg Yes 33.84 kg/m2 Recent pregnancy Never Smoker Emergency Contacts Name Discharge Info Relation Home Work Mobile [address]  CAREGIVER [3] Father [15] [phone] Patient Belongings The following personal items are in your possession at time of discharge: 
  Dental Appliances: None  Visual Aid: None      Home Medications: None   Jewelry: Earrings  Clothing: At bedside, Pants, Shirt    Other Valuables: Cell Phone Please provide this summary of care documentation to your next provider. Signatures-by signing, you are acknowledging that this After Visit Summary has been reviewed with you and you have received a copy. Patient Signature:  ____________________________________________________________ Date:  ____________________________________________________________  
  
Christelle Paredes Provider Signature:  ____________________________________________________________ Date:  ____________________________________________________________

## 2017-12-14 NOTE — IP AVS SNAPSHOT
[address] [facility] 
[phone] Patient: Gaudencio Larsen MRN: [id number] WAY:[birth year] My Medications TAKE these medications as instructed Instructions Each Dose to Equal  
 Morning Noon Evening Bedtime  
 ibuprofen 800 mg tablet Commonly known as:  MOTRIN Your last dose was: Your next dose is: Take 1 Tab by mouth every eight (8) hours. 800 mg  
    
   
   
   
  
 oxyCODONE-acetaminophen 5-325 mg per tablet Commonly known as:  PERCOCET Your last dose was: Your next dose is: Take 1 Tab by mouth every four (4) hours as needed. Max Daily Amount: 6 Tabs. 1 Tab PNV No12-Iron-FA-DSS-OM-3 29 mg iron-1 mg -50 mg Cpkd Your last dose was: Your next dose is: Take  by mouth. SLOW  mg (45 mg iron) ER tablet Generic drug:  ferrous sulfate Your last dose was: Your next dose is: Take  by mouth Daily (before breakfast). Where to Get Your Medications Information on where to get these meds will be given to you by the nurse or doctor. ! Ask your nurse or doctor about these medications  
  ibuprofen 800 mg tablet  
 oxyCODONE-acetaminophen 5-325 mg per tablet

## 2017-12-14 NOTE — PROGRESS NOTES
28 Fr. Ramos inserted into cervix by Dr. Jennifer Mroales. 60cc Sterile water used to inflate balloon.

## 2017-12-15 LAB
ANION GAP SERPL CALC-SCNC: 9 MMOL/L (ref 5–15)
BUN SERPL-MCNC: 8 MG/DL (ref 6–20)
BUN/CREAT SERPL: 8 (ref 12–20)
CALCIUM SERPL-MCNC: 8.8 MG/DL (ref 8.5–10.1)
CHLORIDE SERPL-SCNC: 107 MMOL/L (ref 97–108)
CO2 SERPL-SCNC: 21 MMOL/L (ref 21–32)
CREAT SERPL-MCNC: 0.95 MG/DL (ref 0.55–1.02)
GLUCOSE SERPL-MCNC: 89 MG/DL (ref 65–100)
POTASSIUM SERPL-SCNC: 4.1 MMOL/L (ref 3.5–5.1)
SODIUM SERPL-SCNC: 137 MMOL/L (ref 136–145)

## 2017-12-15 PROCEDURE — 74011000258 HC RX REV CODE- 258: Performed by: OBSTETRICS & GYNECOLOGY

## 2017-12-15 PROCEDURE — 74011250637 HC RX REV CODE- 250/637: Performed by: OBSTETRICS & GYNECOLOGY

## 2017-12-15 PROCEDURE — 77030018809 HC RETRCTR ALXSO DISP AMR -B

## 2017-12-15 PROCEDURE — 0UB10ZZ EXCISION OF LEFT OVARY, OPEN APPROACH: ICD-10-PCS | Performed by: OBSTETRICS & GYNECOLOGY

## 2017-12-15 PROCEDURE — 77030018836 HC SOL IRR NACL ICUM -A

## 2017-12-15 PROCEDURE — 74011250636 HC RX REV CODE- 250/636: Performed by: ANESTHESIOLOGY

## 2017-12-15 PROCEDURE — 76010000391 HC C SECN FIRST 1 HR: Performed by: OBSTETRICS & GYNECOLOGY

## 2017-12-15 PROCEDURE — 77030002933 HC SUT MCRYL J&J -A

## 2017-12-15 PROCEDURE — 36415 COLL VENOUS BLD VENIPUNCTURE: CPT | Performed by: OBSTETRICS & GYNECOLOGY

## 2017-12-15 PROCEDURE — 65410000002 HC RM PRIVATE OB

## 2017-12-15 PROCEDURE — 77030011640 HC PAD GRND REM COVD -A

## 2017-12-15 PROCEDURE — 77030008459 HC STPLR SKN COOP -B

## 2017-12-15 PROCEDURE — 74011250636 HC RX REV CODE- 250/636: Performed by: OBSTETRICS & GYNECOLOGY

## 2017-12-15 PROCEDURE — 77010026065 HC OXYGEN MINIMUM MEDICAL AIR

## 2017-12-15 PROCEDURE — 76060000033 HC ANESTHESIA 1 TO 1.5 HR: Performed by: OBSTETRICS & GYNECOLOGY

## 2017-12-15 PROCEDURE — 74011250636 HC RX REV CODE- 250/636

## 2017-12-15 PROCEDURE — 80048 BASIC METABOLIC PNL TOTAL CA: CPT | Performed by: OBSTETRICS & GYNECOLOGY

## 2017-12-15 PROCEDURE — 88307 TISSUE EXAM BY PATHOLOGIST: CPT | Performed by: OBSTETRICS & GYNECOLOGY

## 2017-12-15 PROCEDURE — 76010000392 HC C SECN EA ADDL 0.5 HR: Performed by: OBSTETRICS & GYNECOLOGY

## 2017-12-15 PROCEDURE — 75410000003 HC RECOV DEL/VAG/CSECN EA 0.5 HR

## 2017-12-15 PROCEDURE — 76060000078 HC EPIDURAL ANESTHESIA: Performed by: OBSTETRICS & GYNECOLOGY

## 2017-12-15 PROCEDURE — 75410000002 HC LABOR FEE PER 1 HR

## 2017-12-15 PROCEDURE — 77030018390 HC SPNG HEMSTAT2 J&J -B

## 2017-12-15 PROCEDURE — 77030010848 HC CATH INTUTR PRSS KOLB -B

## 2017-12-15 PROCEDURE — 74011000250 HC RX REV CODE- 250

## 2017-12-15 PROCEDURE — 77030031139 HC SUT VCRL2 J&J -A

## 2017-12-15 PROCEDURE — 77030032490 HC SLV COMPR SCD KNE COVD -B

## 2017-12-15 PROCEDURE — 88311 DECALCIFY TISSUE: CPT | Performed by: OBSTETRICS & GYNECOLOGY

## 2017-12-15 PROCEDURE — 77030002974 HC SUT PLN J&J -A

## 2017-12-15 RX ORDER — SODIUM CHLORIDE 0.9 % (FLUSH) 0.9 %
5-10 SYRINGE (ML) INJECTION AS NEEDED
Status: DISCONTINUED | OUTPATIENT
Start: 2017-12-15 | End: 2017-12-18 | Stop reason: HOSPADM

## 2017-12-15 RX ORDER — ACETAMINOPHEN 500 MG
1000 TABLET ORAL
Status: DISCONTINUED | OUTPATIENT
Start: 2017-12-15 | End: 2017-12-15

## 2017-12-15 RX ORDER — IBUPROFEN 400 MG/1
800 TABLET ORAL EVERY 8 HOURS
Status: DISCONTINUED | OUTPATIENT
Start: 2017-12-15 | End: 2017-12-18 | Stop reason: HOSPADM

## 2017-12-15 RX ORDER — CLINDAMYCIN PHOSPHATE 900 MG/50ML
900 INJECTION INTRAVENOUS EVERY 8 HOURS
Status: COMPLETED | OUTPATIENT
Start: 2017-12-15 | End: 2017-12-16

## 2017-12-15 RX ORDER — OXYTOCIN 10 [USP'U]/ML
INJECTION, SOLUTION INTRAMUSCULAR; INTRAVENOUS
Status: COMPLETED
Start: 2017-12-15 | End: 2017-12-15

## 2017-12-15 RX ORDER — SODIUM CHLORIDE, SODIUM LACTATE, POTASSIUM CHLORIDE, CALCIUM CHLORIDE 600; 310; 30; 20 MG/100ML; MG/100ML; MG/100ML; MG/100ML
INJECTION, SOLUTION INTRAVENOUS
Status: DISCONTINUED | OUTPATIENT
Start: 2017-12-15 | End: 2017-12-15 | Stop reason: HOSPADM

## 2017-12-15 RX ORDER — ONDANSETRON 2 MG/ML
4 INJECTION INTRAMUSCULAR; INTRAVENOUS
Status: DISCONTINUED | OUTPATIENT
Start: 2017-12-15 | End: 2017-12-18 | Stop reason: HOSPADM

## 2017-12-15 RX ORDER — DIPHENHYDRAMINE HYDROCHLORIDE 50 MG/ML
25-50 INJECTION, SOLUTION INTRAMUSCULAR; INTRAVENOUS
Status: CANCELLED | OUTPATIENT
Start: 2017-12-15

## 2017-12-15 RX ORDER — GENTAMICIN SULFATE 100 MG/50ML
100 INJECTION, SOLUTION INTRAVENOUS EVERY 8 HOURS
Status: COMPLETED | OUTPATIENT
Start: 2017-12-15 | End: 2017-12-16

## 2017-12-15 RX ORDER — ONDANSETRON 2 MG/ML
INJECTION INTRAMUSCULAR; INTRAVENOUS AS NEEDED
Status: DISCONTINUED | OUTPATIENT
Start: 2017-12-15 | End: 2017-12-15 | Stop reason: HOSPADM

## 2017-12-15 RX ORDER — OXYTOCIN 10 [USP'U]/ML
INJECTION, SOLUTION INTRAMUSCULAR; INTRAVENOUS AS NEEDED
Status: DISCONTINUED | OUTPATIENT
Start: 2017-12-15 | End: 2017-12-15 | Stop reason: HOSPADM

## 2017-12-15 RX ORDER — MORPHINE SULFATE 0.5 MG/ML
INJECTION, SOLUTION EPIDURAL; INTRATHECAL; INTRAVENOUS AS NEEDED
Status: DISCONTINUED | OUTPATIENT
Start: 2017-12-15 | End: 2017-12-15 | Stop reason: HOSPADM

## 2017-12-15 RX ORDER — OXYCODONE AND ACETAMINOPHEN 5; 325 MG/1; MG/1
1-2 TABLET ORAL
Status: DISCONTINUED | OUTPATIENT
Start: 2017-12-15 | End: 2017-12-18 | Stop reason: HOSPADM

## 2017-12-15 RX ORDER — BUPIVACAINE HYDROCHLORIDE AND EPINEPHRINE 2.5; 5 MG/ML; UG/ML
INJECTION, SOLUTION EPIDURAL; INFILTRATION; INTRACAUDAL; PERINEURAL
Status: DISPENSED
Start: 2017-12-15 | End: 2017-12-16

## 2017-12-15 RX ORDER — NALOXONE HYDROCHLORIDE 0.4 MG/ML
0.4 INJECTION, SOLUTION INTRAMUSCULAR; INTRAVENOUS; SUBCUTANEOUS AS NEEDED
Status: DISCONTINUED | OUTPATIENT
Start: 2017-12-15 | End: 2017-12-18 | Stop reason: HOSPADM

## 2017-12-15 RX ORDER — SODIUM CHLORIDE, SODIUM LACTATE, POTASSIUM CHLORIDE, CALCIUM CHLORIDE 600; 310; 30; 20 MG/100ML; MG/100ML; MG/100ML; MG/100ML
125 INJECTION, SOLUTION INTRAVENOUS CONTINUOUS
Status: DISCONTINUED | OUTPATIENT
Start: 2017-12-15 | End: 2017-12-16

## 2017-12-15 RX ORDER — SODIUM CHLORIDE 0.9 % (FLUSH) 0.9 %
5-10 SYRINGE (ML) INJECTION EVERY 8 HOURS
Status: DISCONTINUED | OUTPATIENT
Start: 2017-12-15 | End: 2017-12-18 | Stop reason: ALTCHOICE

## 2017-12-15 RX ORDER — KETOROLAC TROMETHAMINE 30 MG/ML
30 INJECTION, SOLUTION INTRAMUSCULAR; INTRAVENOUS
Status: DISCONTINUED | OUTPATIENT
Start: 2017-12-15 | End: 2017-12-18 | Stop reason: HOSPADM

## 2017-12-15 RX ORDER — LIDOCAINE HYDROCHLORIDE 20 MG/ML
INJECTION, SOLUTION EPIDURAL; INFILTRATION; INTRACAUDAL; PERINEURAL AS NEEDED
Status: DISCONTINUED | OUTPATIENT
Start: 2017-12-15 | End: 2017-12-15 | Stop reason: HOSPADM

## 2017-12-15 RX ORDER — DOCUSATE SODIUM 100 MG/1
100 CAPSULE, LIQUID FILLED ORAL 2 TIMES DAILY
Status: DISCONTINUED | OUTPATIENT
Start: 2017-12-15 | End: 2017-12-18 | Stop reason: HOSPADM

## 2017-12-15 RX ORDER — NALOXONE HYDROCHLORIDE 0.4 MG/ML
0.4 INJECTION, SOLUTION INTRAMUSCULAR; INTRAVENOUS; SUBCUTANEOUS AS NEEDED
Status: CANCELLED | OUTPATIENT
Start: 2017-12-15

## 2017-12-15 RX ORDER — SIMETHICONE 80 MG
80 TABLET,CHEWABLE ORAL AS NEEDED
Status: DISCONTINUED | OUTPATIENT
Start: 2017-12-15 | End: 2017-12-18 | Stop reason: HOSPADM

## 2017-12-15 RX ORDER — OXYTOCIN/RINGER'S LACTATE 20/1000 ML
125-500 PLASTIC BAG, INJECTION (ML) INTRAVENOUS ONCE
Status: ACTIVE | OUTPATIENT
Start: 2017-12-15 | End: 2017-12-16

## 2017-12-15 RX ORDER — ONDANSETRON 2 MG/ML
4 INJECTION INTRAMUSCULAR; INTRAVENOUS
Status: CANCELLED | OUTPATIENT
Start: 2017-12-15

## 2017-12-15 RX ORDER — DIPHENHYDRAMINE HYDROCHLORIDE 50 MG/ML
12.5 INJECTION, SOLUTION INTRAMUSCULAR; INTRAVENOUS
Status: DISCONTINUED | OUTPATIENT
Start: 2017-12-15 | End: 2017-12-18 | Stop reason: HOSPADM

## 2017-12-15 RX ORDER — CLINDAMYCIN PHOSPHATE 900 MG/50ML
900 INJECTION INTRAVENOUS ONCE
Status: DISCONTINUED | OUTPATIENT
Start: 2017-12-15 | End: 2017-12-15

## 2017-12-15 RX ORDER — ACETAMINOPHEN 325 MG/1
650 TABLET ORAL
Status: DISCONTINUED | OUTPATIENT
Start: 2017-12-15 | End: 2017-12-18 | Stop reason: HOSPADM

## 2017-12-15 RX ADMIN — GENTAMICIN SULFATE 100 MG: 100 INJECTION, SOLUTION INTRAVENOUS at 10:09

## 2017-12-15 RX ADMIN — OXYTOCIN 20 UNITS: 10 INJECTION, SOLUTION INTRAMUSCULAR; INTRAVENOUS at 14:14

## 2017-12-15 RX ADMIN — SODIUM CHLORIDE, SODIUM LACTATE, POTASSIUM CHLORIDE, AND CALCIUM CHLORIDE 999 ML/HR: 600; 310; 30; 20 INJECTION, SOLUTION INTRAVENOUS at 13:22

## 2017-12-15 RX ADMIN — SODIUM CHLORIDE, SODIUM LACTATE, POTASSIUM CHLORIDE, CALCIUM CHLORIDE: 600; 310; 30; 20 INJECTION, SOLUTION INTRAVENOUS at 14:14

## 2017-12-15 RX ADMIN — MORPHINE SULFATE 5 MG: 0.5 INJECTION, SOLUTION EPIDURAL; INTRATHECAL; INTRAVENOUS at 14:47

## 2017-12-15 RX ADMIN — SODIUM CHLORIDE, SODIUM LACTATE, POTASSIUM CHLORIDE, AND CALCIUM CHLORIDE 999 ML/HR: 600; 310; 30; 20 INJECTION, SOLUTION INTRAVENOUS at 00:24

## 2017-12-15 RX ADMIN — SODIUM CHLORIDE, SODIUM LACTATE, POTASSIUM CHLORIDE, AND CALCIUM CHLORIDE 999 ML/HR: 600; 310; 30; 20 INJECTION, SOLUTION INTRAVENOUS at 09:59

## 2017-12-15 RX ADMIN — LIDOCAINE HYDROCHLORIDE 5 ML: 20 INJECTION, SOLUTION EPIDURAL; INFILTRATION; INTRACAUDAL; PERINEURAL at 13:56

## 2017-12-15 RX ADMIN — LIDOCAINE HYDROCHLORIDE 2 ML: 20 INJECTION, SOLUTION EPIDURAL; INFILTRATION; INTRACAUDAL; PERINEURAL at 14:43

## 2017-12-15 RX ADMIN — ACETAMINOPHEN 1000 MG: 500 TABLET ORAL at 09:00

## 2017-12-15 RX ADMIN — CLINDAMYCIN PHOSPHATE 900 MG: 18 INJECTION, SOLUTION INTRAMUSCULAR; INTRAVENOUS at 21:20

## 2017-12-15 RX ADMIN — LIDOCAINE HYDROCHLORIDE 3 ML: 20 INJECTION, SOLUTION EPIDURAL; INFILTRATION; INTRACAUDAL; PERINEURAL at 14:40

## 2017-12-15 RX ADMIN — PENICILLIN G POTASSIUM 2.5 MILLION UNITS: 20000000 POWDER, FOR SOLUTION INTRAVENOUS at 04:36

## 2017-12-15 RX ADMIN — PENICILLIN G POTASSIUM 2.5 MILLION UNITS: 20000000 POWDER, FOR SOLUTION INTRAVENOUS at 00:29

## 2017-12-15 RX ADMIN — Medication 2 MILLI-UNITS/MIN: at 04:05

## 2017-12-15 RX ADMIN — SODIUM CHLORIDE, SODIUM LACTATE, POTASSIUM CHLORIDE, CALCIUM CHLORIDE: 600; 310; 30; 20 INJECTION, SOLUTION INTRAVENOUS at 15:00

## 2017-12-15 RX ADMIN — CLINDAMYCIN PHOSPHATE 900 MG: 18 INJECTION, SOLUTION INTRAMUSCULAR; INTRAVENOUS at 13:24

## 2017-12-15 RX ADMIN — GENTAMICIN SULFATE 100 MG: 100 INJECTION, SOLUTION INTRAVENOUS at 18:00

## 2017-12-15 RX ADMIN — Medication 16 MILLI-UNITS/MIN: at 12:14

## 2017-12-15 RX ADMIN — KETOROLAC TROMETHAMINE 30 MG: 30 INJECTION, SOLUTION INTRAMUSCULAR at 21:20

## 2017-12-15 RX ADMIN — AMPICILLIN SODIUM 2 G: 2 INJECTION, POWDER, FOR SOLUTION INTRAMUSCULAR; INTRAVENOUS at 07:47

## 2017-12-15 RX ADMIN — AMPICILLIN SODIUM 2 G: 2 INJECTION, POWDER, FOR SOLUTION INTRAMUSCULAR; INTRAVENOUS at 19:02

## 2017-12-15 RX ADMIN — FENTANYL 0.2 MG/100ML-BUPIV 0.125%-NACL 0.9% EPIDURAL INJ 12 ML/HR: 2/0.125 SOLUTION at 04:37

## 2017-12-15 RX ADMIN — FENTANYL 0.2 MG/100ML-BUPIV 0.125%-NACL 0.9% EPIDURAL INJ 12 ML/HR: 2/0.125 SOLUTION at 10:37

## 2017-12-15 RX ADMIN — ONDANSETRON 4 MG: 2 INJECTION INTRAMUSCULAR; INTRAVENOUS at 14:02

## 2017-12-15 RX ADMIN — LIDOCAINE HYDROCHLORIDE 10 ML: 20 INJECTION, SOLUTION EPIDURAL; INFILTRATION; INTRACAUDAL; PERINEURAL at 13:54

## 2017-12-15 RX ADMIN — SODIUM CHLORIDE, SODIUM LACTATE, POTASSIUM CHLORIDE, AND CALCIUM CHLORIDE 125 ML/HR: 600; 310; 30; 20 INJECTION, SOLUTION INTRAVENOUS at 02:00

## 2017-12-15 NOTE — PROGRESS NOTES
LE-1930-care assumed. Pt writhing in pain, rating pain 10/10. Enc deep breathing. 1945-Dr. Elder at bedside, bradley balloon removed easily. 2000-Dr. Anna De La Rosa called to assess pt, SVE done. Cervix 5/90. Bolus initiated for epid. 2057-Dr. Elmer Fraga in f/epid, pt pos. 2115-Pt angelina procedure well. 0630-Pt slept throughout evening, denies c/o's.    0710-Bedside and Verbal shift change report given to SUDHAKAR Blanco (oncoming nurse) by Ralph Darden. Nacho Harrington, RN (offgoing nurse). Report included the following information SBAR, MAR and Recent Results.

## 2017-12-15 NOTE — PROGRESS NOTES
IN to see patient. Has been up ambulating. Contractions every 2-3 minutes. Palpate mild.   Reports increased pressure  \"feels like the balloon is slipping\"   Gentle tug on intracervical bradley and balloon expelled from vagina   Patient reports some relief   Will monitor contractions  Pitocin for augmentation/induction as needed   GBS+  Will start penicillin      Ok to continue ambulation and intermittent monitoring

## 2017-12-15 NOTE — PROGRESS NOTES
Pharmacy Automatic Renal Dosing Protocol - Antimicrobials    Indication for Antimicrobials: Genital tract infection     Current Regimen of Each Antimicrobial:  Ampicillin 2 gm IV every 6 hours (Start Date 12/15/17; Day #1)  Gentamicin 1.5 mg/kg every 8 hours (Start Date 12/15/17; Day #1)    Previous Antimicrobial Therapy: None    Goal Gentamicin Trough: Less than 1.5 mcg/mL  Goal Gentamicin Peak: ~5 mcg/mL    Significant Cultures: None ordered    Paralysis, amputations, malnutrition: N/A    Actual Body Weight: 83.9 kg  Adjust Body Weight: 63.6 kg    Labs:  Recent Labs      17   1606   WBC  11.4*     Temp (24hrs), Av.8 °F (37.7 °C), Min:98.9 °F (37.2 °C), Max:101.2 °F (38.4 °C)    Creatinine Clearance (mL/min) or Dialysis: Greater than 60 mL/min    No results found for: PCT    Impression/Plan:   - Ampicillin dosed appropriately based on indication and renal function. Continue current regimen. - Based on indication, renal function, and weight (Adjusted body weight = 63.6 kg), gentamicin dosed at 100 mg IV every 8 hours. Pharmacy will follow daily and adjust medications as appropriate for renal function and/or serum levels.     Thank you,  Kierra Phelps, PHARMD

## 2017-12-15 NOTE — PROGRESS NOTES
In for possible late decel  FHT 150s, mod variability, ?late decel   SVe 7/c/0  Pit at 10, inadequate mvus    - will reposition, apply O2  - continue pitocin augmentation  - recheck 2 hours, reviewed if no change at that time likely recommend delivery via

## 2017-12-15 NOTE — PROGRESS NOTES
0715: Spoke with Dr. Ward Payment about patients fever; ampicillin and gentamicin to be starts for Triple I   0810: Spoke with Dr. Ward Payment; Discontinuing the [address]: Dr. Vito Lucas at bedside for SVE; 7cm; ordered for 1gr of Tylenol   0840: called pharmacy about gentamicin; said they would tube it. 5986: Dr. Vito Lucas in for SVE due to decels noted on strip; O2 applied;500ml bolus started  1050: O2 removed  1100: Patient sitting straight up/frog legged  1200: patient of lt. side  1305: Karol Ware in for SVE; no change; Csection Called

## 2017-12-15 NOTE — L&D DELIVERY NOTE
Delivery Summary  Patient: Rubin Gao             Circumcision:   desires  Additional Delivery Comments - Admitted for IOL for polyhydramnios. FB-->pitocin-->SROM-->chorio treated with amp/gent-->progresed to 6 with no further change x greater than 6 hours thus pLTCS performed. Known complex left ovarian cyst, underwent left ovarian cystectomy, suspect dermoid. Path pending. William Man. Information for the patient's :  Lisa Plascencia [[id number]]       Labor Events:    Labor: No   Rupture Date: 2017   Rupture Time: 8:55 PM   Rupture Type SROM   Amniotic Fluid Volume:  Moderate    Amniotic Fluid Description: Clear None   Induction: Oxytocin       Augmentation: None   Labor Events: Chorioamnionitis     Cervical Ripenin2017 4:54 PM Ramos/EASI     Delivery Events:  Episiotomy: None   Laceration(s): None     Repaired: None    Number of Repair Packets:     Suture Type and Size: None     Estimated Blood Loss (ml): 1000ml       Delivery Date: 12/15/2017    Delivery Time: 2:13 PM  Delivery Type: , Low Transverse  Sex:  Male     Gestational Age: 39w6d   Delivery Clinician:  Alfie Ware  Living Status: Living   Delivery Location: OR            APGARS  One minute Five minutes Ten minutes   Skin color: 1   1        Heart rate: 2   2        Grimace: 2   2        Muscle tone: 2   2        Breathin   2        Totals: 9   9            Presentation: Vertex    Position:        Resuscitation Method:  Suctioning-bulb;None;Tactile Stimulation     Meconium Stained: None      Cord Vessels: 3 Vessels      Cord Events:    Cord Blood Sent?:  No    Blood Gases Sent?:  No    Placenta:  Date/Time: 12/15  2:14 PM  Removal: Manual Removal      Appearance: Intact     [place] Measurements:  Birth Weight: 3.86 kg      Birth Length: 53.3 cm      Head Circumference: 34.3 cm      Chest Circumference: 33.7 cm     Abdominal Girth: 33 cm    Other Providers:   Camilo Moore P;ALLA CORDERO;JAGDEEP KAUR;;;IVETTE BROWER;;;;CARMEN MATA, Obstetrician;Primary Nurse;Primary  Nurse;Nicu Nurse;Neonatologist;Anesthesiologist;Crna;Nurse Practitioner;Midwife;Nursery Nurse           Cord pH:  none    Episiotomy: None   Laceration(s): None     Estimated Blood Loss (ml): 1000    Labor Events  Method: Oxytocin      Augmentation: None   Cervical Ripenin2017  4:54 PM  [facility] Problems  Date Reviewed: 2017          Codes Class Noted POA    Polyhydramnios affecting pregnancy in third trimester ICD-10-CM: O40. 3XX0  ICD-9-CM: 657.03  2017 Unknown              Operative Vaginal Delivery - none    Group B Strep:   Lab Results   Component Value Date/Time    GrBStrep, External pos 2017     Information for the patient's :  Maco Terry [[id number]]   No results found for: ABORH, PCTABR, PCTDIG, BILI, ABORHEXT, ABORH    No results found for: APH, APCO2, APO2, AHCO3, ABEC, ABDC, O2ST, EPHV, PCO2V, PO2V, HCO3V, EBEV, EBDV, SITE, RSCOM

## 2017-12-15 NOTE — PROGRESS NOTES
In to see patient  IOL for polyhydramnios (rosa 24)  FB-->SROM at 9pm, 5cm at that time  5cm at 3am, IUPC placed  Most recently check by RN 6-7 at 7am  Currently no complaints, epidural in place  Febrile to 101 started on amp/gent  , moderate variability, pos accels, intermittent variable decels but overall reassuring  SVE 7/c/0  Pit at 8   - will continue pitocin augmentation  - continue amp/gent and give tylenol 1gm po  - 6cm complex cyst left ovary (suspected dermoid) - reviewed will evaluate at time of c/s with possible cystectomy vs oopherectomy  - FSR/ceph/gbs pos on amp/efw 8lb3oz

## 2017-12-15 NOTE — ANESTHESIA PREPROCEDURE EVALUATION
Anesthetic History   No history of anesthetic complications            Review of Systems / Medical History  Patient summary reviewed, nursing notes reviewed and pertinent labs reviewed    Pulmonary  Within defined limits                 Neuro/Psych   Within defined limits           Cardiovascular  Within defined limits                Exercise tolerance: >4 METS     GI/Hepatic/Renal  Within defined limits              Endo/Other      Hypothyroidism: well controlled       Other Findings                   Anesthetic Plan    ASA: 2  Anesthesia type: spinal and epidural  CSE          Anesthetic plan and risks discussed with: Patient

## 2017-12-15 NOTE — PROGRESS NOTES
CHG wipes applied x 2 to abdomen. Pt tolerated procedure well. 26  Dr Dorota Dallas at bedside discussing anesthesia POC with family at bedside.

## 2017-12-15 NOTE — PROGRESS NOTES
In to see patient  Feeling pressure  , cat I  toco q 3 min  SVE 7/c/0    - sve now unchanged x at least 6 hours. Reviewed given no change in this time that I recommend proceeding with  section for delivery and she agrees. Reviewed risks of bleeding, infection, damage to surrounding structures, need for additional procedures including hysterectomy. We reviewed her history of left ovarian complex cyst 7cm most recent imaging, felt likely to be dermoid. Reviewed will evaluate and pending findings may proceed with ovarian cystectomy vs oopherectomy vs no treatment. Consent updated.    - currently on amp/gent, will add clindamycin and continue 24 hours afebrile

## 2017-12-15 NOTE — ANESTHESIA POSTPROCEDURE EVALUATION
Post-Anesthesia Evaluation and Assessment    Patient: Kel Gagnon MRN: [id number]  SSN: [id number]    YOB: 1990  Age: 28 y.o. Sex: female       Cardiovascular Function/Vital Signs  Visit Vitals    /73    Pulse (!) 117    Temp 37.3 °C (99.1 °F)    Resp 16    Ht 5' 2\" (1.575 m)    Wt 83.9 kg (185 lb)    SpO2 100%    Breastfeeding Yes    BMI 33.84 kg/m2       Patient is status post spinal, epidural anesthesia for Procedure(s):   SECTION. Nausea/Vomiting: None    Postoperative hydration reviewed and adequate. Pain:  Pain Scale 1: (P) Numeric (0 - 10) (12/15/17 1520)  Pain Intensity 1: (P) 0 (12/15/17 1520)   Managed    Neurological Status:   Neuro (WDL): (P) Within Defined Limits (12/15/17 1520)   At baseline    Mental Status and Level of Consciousness: Arousable    Pulmonary Status:   O2 Device: (P) Room air (12/15/17 1520)   Adequate oxygenation and airway patent    Complications related to anesthesia: None    Post-anesthesia assessment completed.  No concerns    Signed By: Shar Baldwin MD     December 15, 2017

## 2017-12-15 NOTE — PROGRESS NOTES
IN to see patient   Crying and uncomfortable in bed    Contractions every 2 minutes palpate moderate  Category 1 fetal tracing   SVE  90/5/ vertex high  Tense bag of water with contraction   Discussed with patient expectations with labor progression  Does not desire ambulation   Considering nitrous vs epidural    GBS+   Penicillin

## 2017-12-15 NOTE — OP NOTES
Operative Note    Name: Octavio Wharton Record Number: [id number]   YOB: 1990  Today's Date: December 15, 2017      Pre-operative Diagnosis: Arrest of dilation, 6cm complex left ovarian cyst, suspect dermoid    Post-operative Diagnosis: Same    Operation: Primary Low Transverse  Section via pfannenstiel, left ovarian cystectomy    Surgeon(s):  Agnieszka Milner MD    Anesthesia: Spinal    Prophylactic Antibiotics: ampicillin/gentamicin/clindamycin  DVT Prophylaxis: Sequential Compression Devices         Fetal Description: melvin     Birth Information:   Information for the patient's :  Graham Holiday [[id number]]   Delivery of a 3.86 kg Male [2] infant on 12/15/2017 at 2:13 PM. Apgars were 9 and 9. Umbilical Cord:     Umbilical Cord Events:     Placenta:  removal with  appearance. Amniotic Fluid Volume: Moderate     Amniotic Fluid Description:  Clear      Placenta:   Expressed    Estimated Blood Loss (ml):  1000cc    Specimens: left ovarian cyst           Complications:  none    Procedure Detail:      After proper patient identification and consent, the patient was taken to the operating room, where epidural anesthesia was dosed and found to be adequate. Ramos catheter had been placed using sterile technique. The patient was prepped and draped in the normal sterile fashion. The abdomen was entered using the Pfannenstiel technique. The peritoneum was entered sharply well superior to the bladder without any apparent injury. The bladder flap was not created. A low transverse uterine incision was made with the scalpel  and extended with blunt finger dissection. Fluid was moderate amount, cloudy. The babys head was then delivered atraumatically. The cord was clamped and cut and the baby was handed off to  staff in attendance. Placenta was then removed from the uterus.  The uterus was curettaged with a moist lap pad and cleared of all clots and debris. The uterine incision was closed with 0 monocryl, double layer  in running locking fashion with good hemostasis assured. Good hemostasis was again reassured. Attention was then turned to the left ovary which was brought to the incision. There was a predominantly simple appearing cyst noted, with normal appearing ovarian tissue visualized. Decision was made to proceed with cystectomy. An incision was made over the cyst with the bovie and the ovarian tissue overlying the cyst capsule was further incised. The cyst capsule was dissected away from the ovarian tissue with a combination and blunt and sharp dissection. During this dissection the cyst capsule was ruptured and yellow appearing fluid was expressed, along with some hair and sebaceous appearing material, consistent with a dermoid. The remainder of the cyst wall was  from the ovary with sharp and blunt dissection. Once the entirety of the cyst wall was removed, the cyst was handed off the field. The base of the cyst was then cauterized but there were some areas of heavier bleeding which required figure of 8s of 3-0 vicryl. Once hemostasis was adequate surgicel was placed and the deep space of the ovary was closed with a running stitch. A second layer of surgicel was then placed and good hemostasis was noted. The ovary was replaced in the abdomen. The right ovary was evaluated and was normal in appearance. The rectus muscles were then reapproximated with 2-0 vicryl. The fascia was closed with 0 Vicryl in a running fashion. Good hemostasis was assured. The subcutaneous tissue was closed with 3-0 plain gut running stitch. The skin was closed with insorb subcuticular staples. The patient tolerated the procedure well. Sponge, lap, and needle counts were correct times three and the patient and baby were taken to recovery/postpartum room in stable condition.     Niesha Faulkner MD  December 15, 2017  3:41 PM

## 2017-12-15 NOTE — PROGRESS NOTES
Labor Progress Note  Patient seen, fetal heart rate and contraction pattern evaluated. Remains comfortable with epidural    Physical Exam:  Cervical Exam: 5/90/-1  Membranes:  ruptured  Uterine Activity: Frequency: 2-3 minutes  Fetal Heart Rate: 140s,  adequate variability and reactivity  Accelerations: yes  Decelerations: none    Assessment/Plan:  Reassuring fetal status. IUPC placed  Continue augmentation  JOANNE Nunes MD

## 2017-12-16 LAB
BASOPHILS # BLD: 0 K/UL (ref 0–0.1)
BASOPHILS NFR BLD: 0 % (ref 0–1)
EOSINOPHIL # BLD: 0.1 K/UL (ref 0–0.4)
EOSINOPHIL NFR BLD: 0 % (ref 0–7)
ERYTHROCYTE [DISTWIDTH] IN BLOOD BY AUTOMATED COUNT: 16 % (ref 11.5–14.5)
HCT VFR BLD AUTO: 32.5 % (ref 35–47)
HGB BLD-MCNC: 10.8 G/DL (ref 11.5–16)
LYMPHOCYTES # BLD: 1.6 K/UL (ref 0.8–3.5)
LYMPHOCYTES NFR BLD: 9 % (ref 12–49)
MCH RBC QN AUTO: 27.1 PG (ref 26–34)
MCHC RBC AUTO-ENTMCNC: 33.2 G/DL (ref 30–36.5)
MCV RBC AUTO: 81.5 FL (ref 80–99)
MONOCYTES # BLD: 1.7 K/UL (ref 0–1)
MONOCYTES NFR BLD: 9 % (ref 5–13)
NEUTS SEG # BLD: 15.1 K/UL (ref 1.8–8)
NEUTS SEG NFR BLD: 82 % (ref 32–75)
PLATELET # BLD AUTO: 186 K/UL (ref 150–400)
RBC # BLD AUTO: 3.99 M/UL (ref 3.8–5.2)
WBC # BLD AUTO: 18.6 K/UL (ref 3.6–11)

## 2017-12-16 PROCEDURE — 74011250636 HC RX REV CODE- 250/636: Performed by: OBSTETRICS & GYNECOLOGY

## 2017-12-16 PROCEDURE — 65410000002 HC RM PRIVATE OB

## 2017-12-16 PROCEDURE — 85025 COMPLETE CBC W/AUTO DIFF WBC: CPT | Performed by: OBSTETRICS & GYNECOLOGY

## 2017-12-16 PROCEDURE — 74011000258 HC RX REV CODE- 258: Performed by: OBSTETRICS & GYNECOLOGY

## 2017-12-16 PROCEDURE — 74011250637 HC RX REV CODE- 250/637: Performed by: OBSTETRICS & GYNECOLOGY

## 2017-12-16 PROCEDURE — 36415 COLL VENOUS BLD VENIPUNCTURE: CPT | Performed by: OBSTETRICS & GYNECOLOGY

## 2017-12-16 RX ADMIN — IBUPROFEN 800 MG: 400 TABLET ORAL at 22:54

## 2017-12-16 RX ADMIN — AMPICILLIN SODIUM 2 G: 2 INJECTION, POWDER, FOR SOLUTION INTRAMUSCULAR; INTRAVENOUS at 13:35

## 2017-12-16 RX ADMIN — AMPICILLIN SODIUM 2 G: 2 INJECTION, POWDER, FOR SOLUTION INTRAMUSCULAR; INTRAVENOUS at 00:58

## 2017-12-16 RX ADMIN — GENTAMICIN SULFATE 100 MG: 100 INJECTION, SOLUTION INTRAVENOUS at 02:11

## 2017-12-16 RX ADMIN — AMPICILLIN SODIUM 2 G: 2 INJECTION, POWDER, FOR SOLUTION INTRAMUSCULAR; INTRAVENOUS at 06:59

## 2017-12-16 RX ADMIN — CLINDAMYCIN PHOSPHATE 900 MG: 18 INJECTION, SOLUTION INTRAMUSCULAR; INTRAVENOUS at 14:25

## 2017-12-16 RX ADMIN — OXYCODONE HYDROCHLORIDE AND ACETAMINOPHEN 1 TABLET: 5; 325 TABLET ORAL at 22:55

## 2017-12-16 RX ADMIN — SODIUM CHLORIDE, SODIUM LACTATE, POTASSIUM CHLORIDE, AND CALCIUM CHLORIDE 125 ML/HR: 600; 310; 30; 20 INJECTION, SOLUTION INTRAVENOUS at 00:58

## 2017-12-16 RX ADMIN — DOCUSATE SODIUM 100 MG: 100 CAPSULE, LIQUID FILLED ORAL at 10:23

## 2017-12-16 RX ADMIN — OXYCODONE HYDROCHLORIDE AND ACETAMINOPHEN 1 TABLET: 5; 325 TABLET ORAL at 16:56

## 2017-12-16 RX ADMIN — GENTAMICIN SULFATE 100 MG: 100 INJECTION, SOLUTION INTRAVENOUS at 10:11

## 2017-12-16 RX ADMIN — SODIUM CHLORIDE, SODIUM LACTATE, POTASSIUM CHLORIDE, AND CALCIUM CHLORIDE 125 ML/HR: 600; 310; 30; 20 INJECTION, SOLUTION INTRAVENOUS at 11:14

## 2017-12-16 RX ADMIN — IBUPROFEN 800 MG: 400 TABLET ORAL at 15:04

## 2017-12-16 RX ADMIN — CLINDAMYCIN PHOSPHATE 900 MG: 18 INJECTION, SOLUTION INTRAMUSCULAR; INTRAVENOUS at 05:48

## 2017-12-16 NOTE — PROGRESS NOTES
post op csection    2017  7:36 AM    Patient status post Procedure(s):   SECTION on 12/15/2017 under epidural anesthesia with duramorph. Visit Vitals    BP 92/47 (BP 1 Location: Left arm, BP Patient Position: At rest)    Pulse 98    Temp 37.1 °C (98.8 °F)    Resp 17    Ht 5' 2\" (1.575 m)    Wt 83.9 kg (185 lb)    SpO2 98%    Breastfeeding Yes    BMI 33.84 kg/m2     Patient doing relatively well. Moderate itching. Pain is none . No nausea and/or vomiting. Block site reveals normal exam; no erythema, swelling or tenderness. No complications evident. Continue current postop pain regimen.       Kristian Riley MD    [employer]

## 2017-12-16 NOTE — PROGRESS NOTES
Post-Operative  Day 1    Sawyer Salmon     Assessment: Post-Op day 1, stable                         Chorioamnionitis- on Amp/Gent/Clinda, last febrile on 12/15 at 0900                         Ovarian Cystectomy-suspect dermoid, pathology pending     Plan:   1. Routine post-operative care   2. Will continue antibiotics until 24 hours afebrile or 24 hrs after delivery and then will discontinue- stop at 1430 today              3. Desires circumcision- given some feeding issues will perform tomorrow     Information for the patient's :  Graham Holiday [[id number]]   , Low Transverse   Patient doing well without significant complaint. Nausea and vomiting resolved, tolerating liquids, no flatus. Vitals:  Visit Vitals    /64 (BP 1 Location: Left arm, BP Patient Position: At rest)    Pulse (!) 109    Temp 98.8 °F (37.1 °C)    Resp 16    Ht 5' 2\" (1.575 m)    Wt 83.9 kg (185 lb)    SpO2 98%    Breastfeeding Yes    BMI 33.84 kg/m2     Temp (24hrs), Av °F (37.2 °C), Min:98.1 °F (36.7 °C), Max:99.6 °F (37.6 °C)      Last 24hr Input/Output:    Intake/Output Summary (Last 24 hours) at 17 1002  Last data filed at 17 0058   Gross per 24 hour   Intake             1849 ml   Output             1700 ml   Net              149 ml          Exam:        Patient without distress. Lungs clear. Abdomen, bowel sounds present, soft, expected tenderness, fundus firm Wound dressing intact     Perineum normal lochia noted               Lower extremities are negative for swelling, cords or tenderness.     Labs:   Lab Results   Component Value Date/Time    WBC 18.6 2017 05:51 AM    WBC 11.4 2017 04:06 PM    HGB 10.8 2017 05:51 AM    HGB 12.9 2017 04:06 PM    HGB 12.9 2010 12:23 PM    HCT 32.5 2017 05:51 AM    HCT 38.2 2017 04:06 PM    HCT 38.2 2010 12:23 PM    PLATELET 327  05:51 AM    PLATELET 028  04:06 PM Recent Results (from the past 24 hour(s))   CBC WITH AUTOMATED DIFF    Collection Time: 12/16/17  5:51 AM   Result Value Ref Range    WBC 18.6 (H) 3.6 - 11.0 K/uL    RBC 3.99 3.80 - 5.20 M/uL    HGB 10.8 (L) 11.5 - 16.0 g/dL    HCT 32.5 (L) 35.0 - 47.0 %    MCV 81.5 80.0 - 99.0 FL    MCH 27.1 26.0 - 34.0 PG    MCHC 33.2 30.0 - 36.5 g/dL    RDW 16.0 (H) 11.5 - 14.5 %    PLATELET 514 041 - 679 K/uL    NEUTROPHILS 82 (H) 32 - 75 %    LYMPHOCYTES 9 (L) 12 - 49 %    MONOCYTES 9 5 - 13 %    EOSINOPHILS 0 0 - 7 %    BASOPHILS 0 0 - 1 %    ABS. NEUTROPHILS 15.1 (H) 1.8 - 8.0 K/UL    ABS. LYMPHOCYTES 1.6 0.8 - 3.5 K/UL    ABS. MONOCYTES 1.7 (H) 0.0 - 1.0 K/UL    ABS. EOSINOPHILS 0.1 0.0 - 0.4 K/UL    ABS.  BASOPHILS 0.0 0.0 - 0.1 K/UL

## 2017-12-16 NOTE — LACTATION NOTE
This note was copied from a baby's chart. G1  Post op 15 hours of life  6 baby led feedings. Latch initially of 7, improving to 9 with practice hold/position and alignment. Mom taught how to manually hand express her colostrum. Emphasized the importance of providing infant with valuable colostrum as infant rests skin to skin at breast. Aware to avoid extended periods of non-feeding. Taught the rationale behind this low tech but highly effective evidence based practice. Teaching and log initiated. Importance of tracking in observation of daily expectations and stool transitional changes to indicate milk transfer. Offered to show pumping for learning/skills when ready. Assisted latch in football hold/repeated suckle/pause and occasional swallow recognized. Mother is eager to use pacifier, reviewed baby led feeding to satiation/use prn. Will continue to follow and encourage.

## 2017-12-16 NOTE — PROGRESS NOTES
Bedside shift change report given to SUDHAKAR Allen RN (oncoming nurse) by [employer] (offgoing nurse). Report included the following information SBAR, Intake/Output, MAR and Recent Results.

## 2017-12-16 NOTE — PROGRESS NOTES
Patient OOB to BR. Gait steady. Patient denies dizziness. Pericare taught and completed. Shower completed. Linen changed. Patient to bed. Call bell in reach. Patient without needs or complaints.

## 2017-12-17 PROCEDURE — 74011250637 HC RX REV CODE- 250/637: Performed by: OBSTETRICS & GYNECOLOGY

## 2017-12-17 PROCEDURE — 65410000002 HC RM PRIVATE OB

## 2017-12-17 RX ADMIN — OXYCODONE HYDROCHLORIDE AND ACETAMINOPHEN 1 TABLET: 5; 325 TABLET ORAL at 23:50

## 2017-12-17 RX ADMIN — DOCUSATE SODIUM 100 MG: 100 CAPSULE, LIQUID FILLED ORAL at 17:55

## 2017-12-17 RX ADMIN — DOCUSATE SODIUM 100 MG: 100 CAPSULE, LIQUID FILLED ORAL at 09:00

## 2017-12-17 RX ADMIN — IBUPROFEN 800 MG: 400 TABLET ORAL at 15:50

## 2017-12-17 RX ADMIN — IBUPROFEN 800 MG: 400 TABLET ORAL at 08:19

## 2017-12-17 RX ADMIN — IBUPROFEN 800 MG: 400 TABLET ORAL at 23:50

## 2017-12-17 RX ADMIN — OXYCODONE HYDROCHLORIDE AND ACETAMINOPHEN 1 TABLET: 5; 325 TABLET ORAL at 16:39

## 2017-12-17 RX ADMIN — OXYCODONE HYDROCHLORIDE AND ACETAMINOPHEN 1 TABLET: 5; 325 TABLET ORAL at 08:19

## 2017-12-17 NOTE — ROUTINE PROCESS
Bedside shift change report given to OLGA Pérez RN (oncoming nurse) by ELIER Salmon RN (offgoing nurse). Report included the following information SBAR, MAR and Recent Results.

## 2017-12-17 NOTE — ROUTINE PROCESS
Bedside and Verbal shift change report given to ENIO Plunkett RN (oncoming nurse) by SUDHAKAR Noel RN (offgoing nurse). Report included the following information SBAR, Procedure Summary, Intake/Output and MAR.

## 2017-12-17 NOTE — ROUTINE PROCESS
Bedside and Verbal shift change report given to ELIER Velasquez RN (oncoming nurse) by Valentin Disla RN (offgoing nurse). Report included the following information SBAR, Procedure Summary, Intake/Output and MAR.

## 2017-12-17 NOTE — ROUTINE PROCESS
1500 Bedside shift change report given to LOLIS Olvera RN (oncoming nurse) by Francisco J Birmingham. Ben Alva RN (offgoing nurse). Report included the following information SBAR, Kardex, Intake/Output and MAR.

## 2017-12-17 NOTE — PROGRESS NOTES
Bedside shift change report given to ENIO Plunkett RN (oncoming nurse) by Hayley Plascencia RN (offgoing nurse). Report included the following information SBAR and MAR.

## 2017-12-17 NOTE — LACTATION NOTE
This note was copied from a baby's chart. Day 2  Mother has formula request and explained she wants to do both breast and formula. Provided details in daily expectations, 10 breast feds, 4 wet and 3 stools. Also provided 5 ml of ebm. Am wt assessed wdl at -6.3%. Baby did not latch last feeding/disinterest, requests formula. Has not pumped again. Benefits of breast vs formula reviewed. Accepts and chooses to use formula. Provided with instruction, 5-10 ml pacing slowly and only use if infant wants more. [facility] # provided. Call prn.

## 2017-12-18 VITALS
SYSTOLIC BLOOD PRESSURE: 105 MMHG | HEART RATE: 94 BPM | HEIGHT: 62 IN | WEIGHT: 185 LBS | RESPIRATION RATE: 18 BRPM | OXYGEN SATURATION: 98 % | DIASTOLIC BLOOD PRESSURE: 64 MMHG | TEMPERATURE: 98.3 F | BODY MASS INDEX: 34.04 KG/M2

## 2017-12-18 PROCEDURE — 74011250637 HC RX REV CODE- 250/637: Performed by: OBSTETRICS & GYNECOLOGY

## 2017-12-18 RX ORDER — IBUPROFEN 800 MG/1
800 TABLET ORAL EVERY 8 HOURS
Qty: 60 TAB | Refills: 1 | Status: SHIPPED | OUTPATIENT
Start: 2017-12-18 | End: 2022-05-03 | Stop reason: ALTCHOICE

## 2017-12-18 RX ORDER — OXYCODONE AND ACETAMINOPHEN 5; 325 MG/1; MG/1
1 TABLET ORAL
Qty: 30 TAB | Refills: 0 | Status: SHIPPED | OUTPATIENT
Start: 2017-12-18 | End: 2022-05-03 | Stop reason: ALTCHOICE

## 2017-12-18 RX ADMIN — DOCUSATE SODIUM 100 MG: 100 CAPSULE, LIQUID FILLED ORAL at 08:41

## 2017-12-18 RX ADMIN — IBUPROFEN 800 MG: 400 TABLET ORAL at 08:40

## 2017-12-18 NOTE — ROUTINE PROCESS
Bedside shift change report given to SUDHAKAR Carson RN (oncoming nurse) by LOLIS Drake RN (offgoing nurse). Report included the following information SBAR, Procedure Summary, Intake/Output, MAR and Recent Results.

## 2017-12-18 NOTE — DISCHARGE SUMMARY
Obstetrical Discharge Summary     Name: Katheryn Bates MRN: [id number]  SSN: [id number]    YOB: 1990  Age: 28 y.o. Sex: female      Admit Date: 2017    Discharge Date: 2017     Admitting Physician: Lc Samuels MD     Attending Physician:  Chadd Wheeler MD     Admission Diagnoses: cervidil  Polyhydramnios affecting pregnancy in third trimester    Discharge Diagnoses:   Information for the patient's :  Soundra Oz [[id number]]   Delivery of a 3.86 kg male infant via , Low Transverse on 12/15/2017 at 2:13 PM  by . Apgars were 9 and 9. Additional Diagnoses:   Hospital Problems  Date Reviewed: 2017          Codes Class Noted POA    Polyhydramnios affecting pregnancy in third trimester ICD-10-CM: O40. 3XX0  ICD-9-CM: 657.03  2017 Unknown             Lab Results   Component Value Date/Time    Rubella, External Immune 10/12/2017    GrBStrep, External pos 2017       Hospital Course: Patient admitted for IOL secondary to VISHNU 24 at 40+ weeks. Intracervical bradley balloon for ripening, spontaneous expulsion followed by SROM. Labor progressed to 5cm. Pitocin augmentation with adequate MVU dilated 6-7cm but no further progression of labor. Developed fever and treated for triple-I  Delivery by PLTCS, uncomplicated Left ovarian cystectomy performed at time of CS--suspected dermoid. Path pending. Afebrile post delivery    Normal hospital course post partum      Disposition at Discharge: Home or self care    Discharged Condition: Stable    Patient Instructions:   Current Discharge Medication List      START taking these medications    Details   ibuprofen (MOTRIN) 800 mg tablet Take 1 Tab by mouth every eight (8) hours. Qty: 60 Tab, Refills: 1      oxyCODONE-acetaminophen (PERCOCET) 5-325 mg per tablet Take 1 Tab by mouth every four (4) hours as needed. Max Daily Amount: 6 Tabs.   Qty: 30 Tab, Refills: 0         CONTINUE these medications which have NOT CHANGED    Details   PNV No12-Iron-FA-DSS-OM-3 29 mg iron-1 mg -50 mg CPKD Take  by mouth. ferrous sulfate (SLOW FE) 142 mg (45 mg iron) ER tablet Take  by mouth Daily (before breakfast). Reference my discharge instructions.     Follow-up Appointments   Procedures    FOLLOW UP VISIT Appointment in: 6 Weeks     Standing Status:   Standing     Number of Occurrences:   1     Order Specific Question:   Appointment in     Answer:   6 Weeks        Signed By:  Jose Martin Jack MD     December 18, 2017

## 2017-12-18 NOTE — PROGRESS NOTES
Discharge instructions given. Prescriptions given. Questions answered. Verbalized understanding. Discharged to home in stable condition with infant in car seat.

## 2017-12-18 NOTE — DISCHARGE INSTRUCTIONS
Delivery (Postpartum Care): After Your Visit    Your Care Instructions    Your baby was delivered through a cut, called an incision, in your belly. This surgery is called a  delivery, or a . After childbirth (postpartum period), your body goes through many changes. In the next few weeks, your body will slowly heal. It can take 4 weeks or more for the incision from your surgery to heal. It is easy to get too tired and overwhelmed during the first weeks after your baby is born. You may feel emotional during this time. Changes in your hormones can shift your mood without warning. It is easy to get too tired and overwhelmed during the first weeks after childbirth. Take it easy on yourself. You may find it hard to meet the extra demands on your energy and time. Get rest whenever you can, accept help from others, and eat well and drink plenty of fluids. After a , you may have gas or need to burp a lot. You may have some light vaginal bleeding or spotting for up to 6 weeks after surgery. It is normal to have pain in the incision area off and on during the next 6 months. No driving for 1 week after delivery. No heavy lifting. No more than 8 lbs or the size of baby for 2-3 weeks. Limit use of stairs. 1-2 times per day for the first week after delivery. Follow-up care is a key part of your treatment and safety. Be sure to make and go to all appointments, and call your doctor if you are having problems. Its also a good idea to know your test results and keep a list of the medicines you take. Around 4 to 6 weeks after your baby's birth, you will have a follow-up visit with your doctor. This visit is your time to talk to your doctor about anything you are concerned or curious about. Keep a list of questions to bring to your postpartum visit. Your questions might be about:  Changes in your breasts, such as lumps or soreness.   When to expect your menstrual period to start again.  What form of birth control is best for you. Weight you have put on during the pregnancy. Exercise options. What foods and drinks are best for you, especially if you are breast-feeding. Problems you might be having with breast-feeding. When you can have sex. Some women may want to talk about lubricants for the vagina. Any feelings of sadness or restlessness that you are having. How can you care for yourself at home? Be sure that you understand any instructions your doctor has given you after surgery. This will guide your activities and tell you what to watch for in the next few weeks. Vaginal bleeding and cramps  After delivery, you will have a bloody discharge from the vagina. This will turn pink within a week and then white or yellow after about 10 days. It may last for 2 to 4 weeks or longer, until the uterus has healed. You may have cramps for the first few days after childbirth. These are normal and occur as the uterus shrinks to normal size. Take an over-the-counter pain medicine, such as acetaminophen (Tylenol), ibuprofen (Advil, Motrin), or naproxen (Aleve), for cramps. Read and follow all instructions on the label. Do not take aspirin, because it can cause more bleeding. Take other medicines exactly as prescribed. Call your doctor if you have any problems with your medicine. Incision  You may have had staples removed while you were in the hospital. Keep the area clean, and wash it with water and mild soap. If you had stitches, they should dissolve on their own and should not need to be removed. Follow your doctor's instructions for cleaning the stitched area. If you have steri-strips (tape) the will come off on their own in 7-10 days. Breast-feeding and breast fullness  Breast-feed your baby in positions that do not put pressure on your incision, such as side-lying or football-hold positions.  Ask your nurse, doctor, midwife, or lactation specialist to show you these positions if you do not know what they are. Your breasts may overfill (engorge) in the first few days after delivery. To help milk flow and to relieve pain, warm your breasts in the shower or by using warm, moist towels before nursing. Express a small amount of milk to soften your breast near the nipple and then feed your baby. Put an ice or cold pack on your breast for a few minutes after nursing to reduce swelling and pain. Put a thin cloth between the ice pack and your skin. If you are not nursing, do not put warmth on your breasts or touch your breasts. Wear a tight bra or sports bra, and use an ice or cold pack on your breasts to reduce swelling and pain. Breast fullness usually lasts 3 to 4 days. Activity  Eat a balanced diet. Do not try to lose weight by cutting calories. Keep taking your prenatal vitamins, or take a multivitamin. Get as much rest as you can. Try to take naps when your baby sleeps during the day. Get help with household chores from family or friends, if you can. Do not try to do it all yourself. Get some gentle exercise, such as walking, every day. Do not lift more than 10 pounds for the next 4 to 6 weeks. Do not have sex or use tampons until you have stopped bleeding and at least 4 to 6 weeks have passed since you gave birth. Talk to your doctor about birth control. You can get pregnant even before your period returns. Also, you can get pregnant while you are breast-feeding. Mental health  It is normal to have some sadness, anxiety, sleeplessness, and mood swings after you go home. If you feel upset or hopeless for more than a few days, talk to your doctor. If you have any thoughts of hurting yourself or anyone else--including your baby--call 911 or go to the emergency room. Many women get the \"baby blues\" during the first few days after childbirth. The \"baby blues\" usually peak around the fourth day and then ease up in less than 2 weeks.  If you have the \"baby blues\" for more than a few days, or if you have thoughts of hurting yourself or your baby, call your doctor right away. Constipation and hemorrhoids  Drink plenty of fluids (8 to 10 glasses a day), especially water. · Eat plenty of fiber each day to avoid constipation. Include foods such as whole-grain breads and cereals, raw vegetables, raw and dried fruits, and beans. · If you have hemorrhoids or swelling or pain around the opening of your vagina, try using cold and heat. You can put ice or a cold pack on the area for 10 to 20 minutes at a time. Put a thin cloth between the ice and your skin. Also try sitting in a few inches of warm water (sitz bath) 3 times a day and after bowel movements. Drink plenty of fluids, enough so that your urine is light yellow or clear like water. If you have kidney, heart, or liver disease and have to limit fluids, talk with your doctor before you increase the amount of fluids you drink. When should you call for help? Call 911 anytime you think you may need emergency care. For example, call if:  You are thinking of hurting yourself, your baby, or anyone else. You have sudden, severe pain in your belly. You pass out (lose consciousness). Call your doctor now or seek immediate medical care if:  You have severe vaginal bleeding. You are passing blood clots and soaking through a pad each hour for 2 or more hours. Your vaginal bleeding seems to be getting heavier or is still bright red 4 days after delivery, or you pass blood clots larger than the size of a golf ball. You have increased redness, heat, or drainage in the incision area. You are dizzy or lightheaded, or you feel like you may faint. You are vomiting or cannot keep fluids down. You have a fever. You have new or more belly pain. You pass tissue (not just blood). The incision seems to be pulling open or starts bleeding. Your vaginal discharge smells bad. Your belly feels more tender or full and hard.   You have pain or redness in one or both breasts. You feel sad, anxious, or hopeless for more than a few days. Where can you learn more? Go to Replise.be    Enter Y576 in the search box to learn more about \" Delivery: After Your Visit\". or     Go to [email or web address].be    Enter L754  in the search box to learn more about \"Postpartum Care: After Your Visit\". This care instruction is for use with your licensed healthcare professional. If you have questions about a medical condition or this instruction, always ask your healthcare professional. Care instructions adapted by Moshe Willson (which disclaims liability or warranty for this information) from Kirstie Schultz, [address] [place] [address] . [facility] disclaims any warranty or liability for your use of this information. [employer] Activation    Thank you for requesting access to [employer]. Please follow the instructions below to securely access and download your online medical record. [employer] allows you to send messages to your doctor, view your test results, renew your prescriptions, schedule appointments, and more. How Do I Sign Up? 1. In your internet browser, go to [email or web address]  2. Click on the First Time User? Click Here link in the Sign In box. You will be redirect to the New Member Sign Up page. 3. Enter your [employer] Access Code exactly as it appears below. You will not need to use this code after youve completed the sign-up process. If you do not sign up before the expiration date, you must request a new code. [employer] Access Code: [email or web address]  Expires: 2018  4:48 PM (This is the date your [employer] access code will )    4. Enter the last four digits of your Social Security Number (xxxx) and Date of Birth (mm/dd/yyyy) as indicated and click Submit. You will be taken to the next sign-up page. 5. Create a [employer] Rights ID.  This will be your [employer] login ID and cannot be changed, so think of one that is secure and easy to remember. 6. Create a [employer] password. You can change your password at any time. 7. Enter your Password Reset Question and Answer. This can be used at a later time if you forget your password. 8. Enter your e-mail address. You will receive e-mail notification when new information is available in [address] 9. Click Sign Up. You can now view and download portions of your medical record. 10. Click the Download Summary menu link to download a portable copy of your medical information. Additional Information    If you have questions, please visit the Frequently Asked Questions section of the [employer] website at [email or web address] Remember, [employer] is NOT to be used for urgent needs. For medical emergencies, dial 911.

## 2017-12-18 NOTE — PROGRESS NOTES
Post-Operative  Day 3    Nelly Salmon       Assessment: Post-Op day 3, doing well  Triple I--resolved post delivery. 72 hr afebrile, >24hr off antibiotics    Left ovarian cystectomy. Path pending   Suspected dermoid     Plan:   1. Discharge home today  2. Follow up in office in 6 weeks with Kirill Carter MD  3. Post partum activity/wound care advised, diet as tolerated  4. Discharge Medications: ibuprofen, percocet and medications prior to admission      Information for the patient's :  Franca Herrera [[id number]]   , Low Transverse   Patient doing well without significant complaint. Tolerating diet, passing flatus, voiding and ambulating without difficulty    Vitals:  Visit Vitals    /64 (BP 1 Location: Right arm, BP Patient Position: At rest)    Pulse 94    Temp 98.3 °F (36.8 °C)    Resp 18    Ht 5' 2\" (1.575 m)    Wt 83.9 kg (185 lb)    SpO2 98%    Breastfeeding Yes    BMI 33.84 kg/m2     Temp (24hrs), Av °F (36.7 °C), Min:97.7 °F (36.5 °C), Max:98.3 °F (36.8 °C)        Exam:        Patient without distress. Abdomen, bowel sounds present, soft, expected tenderness, fundus firm                Wound incision clean, dry and intact               Lower extremities are negative for swelling, cords or tenderness. Labs:   Lab Results   Component Value Date/Time    WBC 18.6 2017 05:51 AM    WBC 11.4 2017 04:06 PM    HGB 10.8 2017 05:51 AM    HGB 12.9 2017 04:06 PM    HGB 12.9 2010 12:23 PM    HCT 32.5 2017 05:51 AM    HCT 38.2 2017 04:06 PM    HCT 38.2 2010 12:23 PM    PLATELET 829  05:51 AM    PLATELET 170  04:06 PM       No results found for this or any previous visit (from the past 24 hour(s)).

## 2017-12-18 NOTE — LACTATION NOTE
This note was copied from a baby's chart.     Couplet Interdisciplinary Rounds     MATERNAL    Daily Goal:     Influenza screening completed: YES   Tdap screening completed: YES   Rhogam Given:N/A  MMR Given:N/A    VTE Prophylaxis: Not indicated, per Provider order    EPDS:            Patient Name: Kimmie Gomez Diagnosis: [place]  Single liveborn, born in hospital, delivered by  delivery  Liveborn infant, born in hospital, delivered by    Date of Admission: 12/15/2017 LOS: 3  Gestational Age: Gestational Age: 39w6d       Daily Goal:     Birth Weight: 3.86 kg Current Weight: Weight: 3.62 kg (7 lbs 15.7 oz)  % of Weight Change: -6%    Feeding:   Metabolic Screen: YES and Initial    Hepatitis B:  YES and On MAR    Discharge Bili:  YES  Car Seat Trial, if needed:  N/A      Patient/Family Teaching Needs:     Days before discharge: Ready for discharge    In Attendance:  Nursing and Physician

## 2017-12-18 NOTE — PROGRESS NOTES
Bedside shift change report given to ELIER Villegas (oncoming nurse) by A. Benigno Schaumann, RN (offgoing nurse). Report included the following information SBAR, Procedure Summary, Intake/Output, MAR and Recent Results.

## 2021-11-11 ENCOUNTER — OFFICE VISIT (OUTPATIENT)
Dept: FAMILY MEDICINE CLINIC | Age: 31
End: 2021-11-11
Payer: COMMERCIAL

## 2021-11-11 VITALS
BODY MASS INDEX: 42.14 KG/M2 | RESPIRATION RATE: 16 BRPM | DIASTOLIC BLOOD PRESSURE: 80 MMHG | SYSTOLIC BLOOD PRESSURE: 110 MMHG | HEART RATE: 80 BPM | TEMPERATURE: 98 F | HEIGHT: 62 IN | WEIGHT: 229 LBS

## 2021-11-11 DIAGNOSIS — L30.9 ECZEMA, UNSPECIFIED TYPE: ICD-10-CM

## 2021-11-11 DIAGNOSIS — E66.9 OBESITY, UNSPECIFIED CLASSIFICATION, UNSPECIFIED OBESITY TYPE, UNSPECIFIED WHETHER SERIOUS COMORBIDITY PRESENT: ICD-10-CM

## 2021-11-11 DIAGNOSIS — L91.8 SKIN TAGS, MULTIPLE ACQUIRED: ICD-10-CM

## 2021-11-11 DIAGNOSIS — Z00.00 ANNUAL PHYSICAL EXAM: Primary | ICD-10-CM

## 2021-11-11 LAB
ALBUMIN SERPL-MCNC: 3.9 G/DL (ref 3.5–5)
ALBUMIN/GLOB SERPL: 1 {RATIO} (ref 1.1–2.2)
ALP SERPL-CCNC: 56 U/L (ref 45–117)
ALT SERPL-CCNC: 28 U/L (ref 12–78)
ANION GAP SERPL CALC-SCNC: 5 MMOL/L (ref 5–15)
AST SERPL-CCNC: 14 U/L (ref 15–37)
BASOPHILS # BLD: 0 K/UL (ref 0–0.1)
BASOPHILS NFR BLD: 1 % (ref 0–1)
BILIRUB SERPL-MCNC: 0.8 MG/DL (ref 0.2–1)
BUN SERPL-MCNC: 10 MG/DL (ref 6–20)
BUN/CREAT SERPL: 12 (ref 12–20)
CALCIUM SERPL-MCNC: 9.3 MG/DL (ref 8.5–10.1)
CHLORIDE SERPL-SCNC: 109 MMOL/L (ref 97–108)
CHOLEST SERPL-MCNC: 191 MG/DL
CO2 SERPL-SCNC: 27 MMOL/L (ref 21–32)
CREAT SERPL-MCNC: 0.86 MG/DL (ref 0.55–1.02)
DIFFERENTIAL METHOD BLD: NORMAL
EOSINOPHIL # BLD: 0.2 K/UL (ref 0–0.4)
EOSINOPHIL NFR BLD: 3 % (ref 0–7)
ERYTHROCYTE [DISTWIDTH] IN BLOOD BY AUTOMATED COUNT: 13.5 % (ref 11.5–14.5)
EST. AVERAGE GLUCOSE BLD GHB EST-MCNC: 114 MG/DL
GLOBULIN SER CALC-MCNC: 3.9 G/DL (ref 2–4)
GLUCOSE SERPL-MCNC: 83 MG/DL (ref 65–100)
HBA1C MFR BLD: 5.6 % (ref 4–5.6)
HCT VFR BLD AUTO: 42 % (ref 35–47)
HDLC SERPL-MCNC: 45 MG/DL
HDLC SERPL: 4.2 {RATIO} (ref 0–5)
HGB BLD-MCNC: 13.4 G/DL (ref 11.5–16)
IMM GRANULOCYTES # BLD AUTO: 0 K/UL (ref 0–0.04)
IMM GRANULOCYTES NFR BLD AUTO: 0 % (ref 0–0.5)
LDLC SERPL CALC-MCNC: 126.8 MG/DL (ref 0–100)
LYMPHOCYTES # BLD: 1.8 K/UL (ref 0.8–3.5)
LYMPHOCYTES NFR BLD: 24 % (ref 12–49)
MCH RBC QN AUTO: 27.9 PG (ref 26–34)
MCHC RBC AUTO-ENTMCNC: 31.9 G/DL (ref 30–36.5)
MCV RBC AUTO: 87.3 FL (ref 80–99)
MONOCYTES # BLD: 0.5 K/UL (ref 0–1)
MONOCYTES NFR BLD: 7 % (ref 5–13)
NEUTS SEG # BLD: 4.8 K/UL (ref 1.8–8)
NEUTS SEG NFR BLD: 65 % (ref 32–75)
NRBC # BLD: 0 K/UL (ref 0–0.01)
NRBC BLD-RTO: 0 PER 100 WBC
PLATELET # BLD AUTO: 304 K/UL (ref 150–400)
PMV BLD AUTO: 11.5 FL (ref 8.9–12.9)
POTASSIUM SERPL-SCNC: 3.9 MMOL/L (ref 3.5–5.1)
PROT SERPL-MCNC: 7.8 G/DL (ref 6.4–8.2)
RBC # BLD AUTO: 4.81 M/UL (ref 3.8–5.2)
SODIUM SERPL-SCNC: 141 MMOL/L (ref 136–145)
T4 FREE SERPL-MCNC: 0.9 NG/DL (ref 0.8–1.5)
TRIGL SERPL-MCNC: 96 MG/DL (ref ?–150)
TSH SERPL DL<=0.05 MIU/L-ACNC: 1.15 UIU/ML (ref 0.36–3.74)
VLDLC SERPL CALC-MCNC: 19.2 MG/DL
WBC # BLD AUTO: 7.4 K/UL (ref 3.6–11)

## 2021-11-11 PROCEDURE — 99395 PREV VISIT EST AGE 18-39: CPT | Performed by: FAMILY MEDICINE

## 2021-11-11 RX ORDER — TOPIRAMATE 50 MG/1
TABLET, FILM COATED ORAL
Qty: 60 TABLET | Refills: 12 | Status: SHIPPED | OUTPATIENT
Start: 2021-11-11

## 2021-11-11 RX ORDER — PHENTERMINE HYDROCHLORIDE 37.5 MG/1
37.5 TABLET ORAL
Qty: 30 TABLET | Refills: 5 | Status: SHIPPED | OUTPATIENT
Start: 2021-11-11 | End: 2022-05-03 | Stop reason: SDUPTHER

## 2021-11-11 RX ORDER — TRIAMCINOLONE ACETONIDE 5 MG/G
CREAM TOPICAL 2 TIMES DAILY
Qty: 45 G | Refills: 5 | Status: SHIPPED | OUTPATIENT
Start: 2021-11-11

## 2021-11-11 NOTE — PROGRESS NOTES
HISTORY OF PRESENT ILLNESS  Ayesha Patterson is a 27 y.o. female. HPI In for checkup and to establish care. Has a few skin tags that wants checked. Also would like to lose weight. Occasional headache. Also has eczema. Not on any meds on a regular basis. Sees Dr. Patti Butler for gyn care. Review of Systems   Constitutional: Negative for malaise/fatigue and weight loss. HENT: Negative for hearing loss and tinnitus. Eyes: Negative for blurred vision and double vision. Respiratory: Negative for cough and shortness of breath. Nonsmoker   Cardiovascular: Negative for chest pain and leg swelling. Does lots of walking. Gastrointestinal: Negative for abdominal pain and blood in stool. Genitourinary: Negative for dysuria and hematuria. Skin: Positive for rash. Negative for itching. eczema   Neurological: Negative for loss of consciousness and weakness. Psychiatric/Behavioral: Negative for depression. The patient does not have insomnia. Physical Exam  Vitals and nursing note reviewed. Constitutional:       Appearance: She is well-developed. HENT:      Right Ear: External ear normal.      Left Ear: External ear normal.   Neck:      Thyroid: No thyromegaly. Cardiovascular:      Rate and Rhythm: Normal rate and regular rhythm. Heart sounds: Normal heart sounds. Pulmonary:      Breath sounds: Normal breath sounds. No wheezing. Abdominal:      General: Bowel sounds are normal. There is no distension. Palpations: Abdomen is soft. There is no mass. Tenderness: There is no abdominal tenderness. Lymphadenopathy:      Cervical: No cervical adenopathy.    Skin:     Comments: Multiple skin tags on neck         ASSESSMENT and PLAN  Orders Placed This Encounter    CBC WITH AUTOMATED DIFF    METABOLIC PANEL, COMPREHENSIVE    LIPID PANEL    HEMOGLOBIN A1C WITH EAG    T4, FREE    TSH 3RD GENERATION    triamcinolone (ARISTOCORT) 0.5 % topical cream    phentermine (ADIPEX-P) 37.5 mg tablet    topiramate (TOPAMAX) 50 mg tablet     Diagnoses and all orders for this visit:    1. Annual physical exam  -     CBC WITH AUTOMATED DIFF; Future  -     METABOLIC PANEL, COMPREHENSIVE; Future  -     LIPID PANEL; Future  -     HEMOGLOBIN A1C WITH EAG; Future  -     T4, FREE; Future  -     TSH 3RD GENERATION; Future    2. Obesity, unspecified classification, unspecified obesity type, unspecified whether serious comorbidity present  -     phentermine (ADIPEX-P) 37.5 mg tablet; Take 1 Tablet by mouth every morning. Max Daily Amount: 37.5 mg. For weight loss    3. Skin tags, multiple acquired    4. Eczema, unspecified type    Other orders  -     triamcinolone (ARISTOCORT) 0.5 % topical cream; Apply  to affected area two (2) times a day. use thin layer  -     topiramate (TOPAMAX) 50 mg tablet;  One tab po at bedtime for 2 weeks, then increase to 1 tab po bid          Skin tags frozen

## 2021-11-11 NOTE — PROGRESS NOTES
Chief Complaint   Patient presents with   [facility] [employer] Establish Care     1. Have you been to the ER, urgent care clinic since your last visit? Hospitalized since your last visit? No    2. Have you seen or consulted any other health care providers outside of the [address] [place] [address] since your last visit? Include any pap smears or colon screening. Non    Patient here to establish. Last PCP Dr. Patricia Orosco. One child age [de-identified].   [employer] 5865

## 2021-11-26 ENCOUNTER — TELEPHONE (OUTPATIENT)
Dept: FAMILY MEDICINE CLINIC | Age: 31
End: 2021-11-26

## 2022-03-19 PROBLEM — O40.3XX0 POLYHYDRAMNIOS AFFECTING PREGNANCY IN THIRD TRIMESTER: Status: ACTIVE | Noted: 2017-12-14

## 2022-05-03 ENCOUNTER — OFFICE VISIT (OUTPATIENT)
Dept: FAMILY MEDICINE CLINIC | Age: 32
End: 2022-05-03
Payer: COMMERCIAL

## 2022-05-03 VITALS
RESPIRATION RATE: 16 BRPM | SYSTOLIC BLOOD PRESSURE: 108 MMHG | HEART RATE: 77 BPM | HEIGHT: 62 IN | DIASTOLIC BLOOD PRESSURE: 76 MMHG | WEIGHT: 226 LBS | BODY MASS INDEX: 41.59 KG/M2 | TEMPERATURE: 98 F | OXYGEN SATURATION: 100 %

## 2022-05-03 DIAGNOSIS — E66.9 OBESITY, UNSPECIFIED CLASSIFICATION, UNSPECIFIED OBESITY TYPE, UNSPECIFIED WHETHER SERIOUS COMORBIDITY PRESENT: ICD-10-CM

## 2022-05-03 DIAGNOSIS — L30.9 DERMATITIS: Primary | ICD-10-CM

## 2022-05-03 PROCEDURE — 99213 OFFICE O/P EST LOW 20 MIN: CPT | Performed by: FAMILY MEDICINE

## 2022-05-03 RX ORDER — TRIAMCINOLONE ACETONIDE 1 MG/G
CREAM TOPICAL 2 TIMES DAILY
Qty: 15 G | Refills: 2 | Status: SHIPPED | OUTPATIENT
Start: 2022-05-03

## 2022-05-03 RX ORDER — PHENTERMINE HYDROCHLORIDE 37.5 MG/1
37.5 TABLET ORAL
Qty: 30 TABLET | Refills: 5 | Status: SHIPPED | OUTPATIENT
Start: 2022-05-03

## 2022-05-03 NOTE — PROGRESS NOTES
HISTORY OF PRESENT ILLNESS  Moiz Love is a 27 y.o. female. HPI In for followup. Thinks that has psoriasis, both in scalp and near ear. Has been using some OTC shampoo- minimal relief. ROS    Physical Exam  Vitals and nursing note reviewed. Constitutional:       Appearance: She is well-developed. HENT:      Right Ear: External ear normal.      Left Ear: External ear normal.   Neck:      Thyroid: No thyromegaly. Cardiovascular:      Rate and Rhythm: Normal rate and regular rhythm. Heart sounds: Normal heart sounds. Pulmonary:      Breath sounds: Normal breath sounds. No wheezing. Abdominal:      General: Bowel sounds are normal. There is no distension. Palpations: Abdomen is soft. There is no mass. Tenderness: There is no abdominal tenderness. Lymphadenopathy:      Cervical: No cervical adenopathy. Skin:     Comments: Small 0.5 cm area of scaling just anterior to R ear  Scalp- mild scaling present. ASSESSMENT and PLAN  Orders Placed This Encounter    phentermine (ADIPEX-P) 37.5 mg tablet    hydrocortisone 2 % lotion    triamcinolone acetonide (KENALOG) 0.1 % topical cream     Diagnoses and all orders for this visit:    1. Dermatitis    2. Obesity, unspecified classification, unspecified obesity type, unspecified whether serious comorbidity present  -     phentermine (ADIPEX-P) 37.5 mg tablet; Take 1 Tablet by mouth every morning. Max Daily Amount: 37.5 mg. For weight loss    Other orders  -     hydrocortisone 2 % lotion; Apply  to affected area two (2) times a day. -     triamcinolone acetonide (KENALOG) 0.1 % topical cream; Apply  to affected area two (2) times a day.  use thin layer

## 2022-05-03 NOTE — PROGRESS NOTES
Chief Complaint   Patient presents with    Hair/Scalp Problem       1. \"Have you been to the ER, urgent care clinic since your last visit? Hospitalized since your last visit? \" No    2. \"Have you seen or consulted any other health care providers outside of the [address] [place] [address] since your last visit? \" No     3. For patients aged 39-70: Has the patient had a colonoscopy / FIT/ Cologuard? NA - based on age      If the patient is female:    4. For patients aged 41-77: Has the patient had a mammogram within the past 2 years? NA - based on age or sex      11. For patients aged 21-65: Has the patient had a pap smear?  No    Health Maintenance Due   Topic Date Due    Hepatitis C Screening  Never done    DTaP/Tdap/Td series (1 - Tdap) Never done    Cervical cancer screen  Never done

## 2023-07-27 ENCOUNTER — OFFICE VISIT (OUTPATIENT)
Age: 33
End: 2023-07-27
Payer: COMMERCIAL

## 2023-07-27 VITALS
HEIGHT: 62 IN | WEIGHT: 255.4 LBS | DIASTOLIC BLOOD PRESSURE: 69 MMHG | RESPIRATION RATE: 16 BRPM | OXYGEN SATURATION: 97 % | BODY MASS INDEX: 47 KG/M2 | HEART RATE: 78 BPM | SYSTOLIC BLOOD PRESSURE: 123 MMHG

## 2023-07-27 DIAGNOSIS — Z00.00 ANNUAL PHYSICAL EXAM: ICD-10-CM

## 2023-07-27 DIAGNOSIS — E66.09 OBESITY DUE TO EXCESS CALORIES WITHOUT SERIOUS COMORBIDITY, UNSPECIFIED CLASSIFICATION: Primary | ICD-10-CM

## 2023-07-27 PROCEDURE — 99213 OFFICE O/P EST LOW 20 MIN: CPT | Performed by: FAMILY MEDICINE

## 2023-07-27 RX ORDER — PHENTERMINE HYDROCHLORIDE 37.5 MG/1
37.5 TABLET ORAL
Qty: 30 TABLET | Refills: 5 | Status: SHIPPED | OUTPATIENT
Start: 2023-07-27 | End: 2024-01-23

## 2023-07-27 NOTE — PROGRESS NOTES
Omaira Araujo (:  1990) is a 34 y.o. female,Established patient, here for evaluation of the following chief complaint(s): Annual Exam         ASSESSMENT/PLAN:  1. Obesity due to excess calories without serious comorbidity, unspecified classification  -     phentermine (ADIPEX-P) 37.5 MG tablet; Take 1 tablet by mouth every morning (before breakfast) for 180 days. Max Daily Amount: 37.5 mg, Disp-30 tablet, R-5Normal  2. Annual physical exam  -     Comprehensive Metabolic Panel; Future  -     Hemoglobin A1C; Future  -     Lipid Panel; Future  -     CBC with Auto Differential; Future      No follow-ups on file. Subjective   SUBJECTIVE/OBJECTIVE:  HPI In for checkup. Still trying to lose weight. Had lost 20 lbs on Phentermine, it ran out, then insurance didn't want to cover it. Was also taking the topamax. No side effects on either medicine. Works for [employer] in [place]. Review of Systems       Objective   Physical Exam  Cardiovascular:      Rate and Rhythm: Normal rate and regular rhythm. Heart sounds: Normal heart sounds. No murmur heard. Pulmonary:      Effort: Pulmonary effort is normal.      Breath sounds: Normal breath sounds. Abdominal:      General: Abdomen is flat. Bowel sounds are normal.      Palpations: Abdomen is soft. Musculoskeletal:      Right lower leg: No edema. Left lower leg: No edema. An electronic signature was used to authenticate this note.     --Ra James MD

## 2023-07-27 NOTE — PROGRESS NOTES
Chief Complaint   Patient presents with    Annual Exam         1. \"Have you been to the ER, urgent care clinic since your last visit? Hospitalized since your last visit? \" no    2. \"Have you seen or consulted any other health care providers outside of the [address] [place] [address] since your last visit? \" Bessie Santos     3. For patients aged 43-73: Has the patient had a colonoscopy / FIT/ Cologuard? na      If the patient is female:    4. For patients aged 43-66: Has the patient had a mammogram within the past 2 years? yes      5. For patients aged 21-65: Has the patient had a pap smear?  Dr. Pita Santos - will send letter for results      Health Maintenance Due   Topic Date Due    Varicella vaccine (1 of 2 - 2-dose childhood series) Never done    Hepatitis C screen  Never done    DTaP/Tdap/Td vaccine (1 - Tdap) Never done    Cervical cancer screen  Never done    COVID-19 Vaccine (3 - Booster for Pfizer series) 04/14/2021    Depression Screen  05/03/2023

## 2023-07-28 LAB
ALBUMIN SERPL-MCNC: 3.9 G/DL (ref 3.5–5)
ALBUMIN/GLOB SERPL: 0.9 (ref 1.1–2.2)
ALP SERPL-CCNC: 67 U/L (ref 45–117)
ALT SERPL-CCNC: 45 U/L (ref 12–78)
ANION GAP SERPL CALC-SCNC: 3 MMOL/L (ref 5–15)
AST SERPL-CCNC: 23 U/L (ref 15–37)
BASOPHILS # BLD: 0.1 K/UL (ref 0–0.1)
BASOPHILS NFR BLD: 1 % (ref 0–1)
BILIRUB SERPL-MCNC: 0.5 MG/DL (ref 0.2–1)
BUN SERPL-MCNC: 15 MG/DL (ref 6–20)
BUN/CREAT SERPL: 17 (ref 12–20)
CALCIUM SERPL-MCNC: 9.6 MG/DL (ref 8.5–10.1)
CHLORIDE SERPL-SCNC: 105 MMOL/L (ref 97–108)
CHOLEST SERPL-MCNC: 202 MG/DL
CO2 SERPL-SCNC: 28 MMOL/L (ref 21–32)
CREAT SERPL-MCNC: 0.88 MG/DL (ref 0.55–1.02)
DIFFERENTIAL METHOD BLD: NORMAL
EOSINOPHIL # BLD: 0.2 K/UL (ref 0–0.4)
EOSINOPHIL NFR BLD: 2 % (ref 0–7)
ERYTHROCYTE [DISTWIDTH] IN BLOOD BY AUTOMATED COUNT: 13.1 % (ref 11.5–14.5)
EST. AVERAGE GLUCOSE BLD GHB EST-MCNC: 117 MG/DL
GLOBULIN SER CALC-MCNC: 4.3 G/DL (ref 2–4)
GLUCOSE SERPL-MCNC: 82 MG/DL (ref 65–100)
HBA1C MFR BLD: 5.7 % (ref 4–5.6)
HCT VFR BLD AUTO: 42.2 % (ref 35–47)
HDLC SERPL-MCNC: 45 MG/DL
HDLC SERPL: 4.5 (ref 0–5)
HGB BLD-MCNC: 13.3 G/DL (ref 11.5–16)
IMM GRANULOCYTES # BLD AUTO: 0 K/UL (ref 0–0.04)
IMM GRANULOCYTES NFR BLD AUTO: 0 % (ref 0–0.5)
LDLC SERPL CALC-MCNC: 121.6 MG/DL (ref 0–100)
LYMPHOCYTES # BLD: 2.2 K/UL (ref 0.8–3.5)
LYMPHOCYTES NFR BLD: 24 % (ref 12–49)
MCH RBC QN AUTO: 27.4 PG (ref 26–34)
MCHC RBC AUTO-ENTMCNC: 31.5 G/DL (ref 30–36.5)
MCV RBC AUTO: 86.8 FL (ref 80–99)
MONOCYTES # BLD: 0.7 K/UL (ref 0–1)
MONOCYTES NFR BLD: 7 % (ref 5–13)
NEUTS SEG # BLD: 5.8 K/UL (ref 1.8–8)
NEUTS SEG NFR BLD: 66 % (ref 32–75)
NRBC # BLD: 0 K/UL (ref 0–0.01)
NRBC BLD-RTO: 0 PER 100 WBC
PLATELET # BLD AUTO: 302 K/UL (ref 150–400)
PMV BLD AUTO: 11.7 FL (ref 8.9–12.9)
POTASSIUM SERPL-SCNC: 4.4 MMOL/L (ref 3.5–5.1)
PROT SERPL-MCNC: 8.2 G/DL (ref 6.4–8.2)
RBC # BLD AUTO: 4.86 M/UL (ref 3.8–5.2)
SODIUM SERPL-SCNC: 136 MMOL/L (ref 136–145)
TRIGL SERPL-MCNC: 177 MG/DL
VLDLC SERPL CALC-MCNC: 35.4 MG/DL
WBC # BLD AUTO: 8.9 K/UL (ref 3.6–11)

## 2023-08-02 ENCOUNTER — TELEPHONE (OUTPATIENT)
Age: 33
End: 2023-08-02

## 2023-08-02 RX ORDER — SEMAGLUTIDE 1.34 MG/ML
INJECTION, SOLUTION SUBCUTANEOUS
Qty: 1.5 ML | Refills: 12 | Status: SHIPPED | OUTPATIENT
Start: 2023-08-02

## 2023-08-30 RX ORDER — TOPIRAMATE 50 MG/1
50 TABLET, FILM COATED ORAL 2 TIMES DAILY
Qty: 180 TABLET | Refills: 3 | Status: SHIPPED | OUTPATIENT
Start: 2023-08-30

## 2023-08-30 NOTE — TELEPHONE ENCOUNTER
I show Topamax was d/c on 7/27/23 citing \"therapy complete\". Please sign if appropriate. Last appointment: 7/27/23  Next appointment: 1/29/24  Previous refill encounter(s): 11/11/21 #60 with 12 refills    Requested Prescriptions     Pending Prescriptions Disp Refills    topiramate (TOPAMAX) 50 MG tablet 180 tablet 3     Sig: Take 1 tablet by mouth 2 times daily         For Pharmacy Admin Tracking Only    Program: Medication Refill  CPA in place:    Recommendation Provided To:    Intervention Detail: New Rx: 1, reason: Patient Preference  Intervention Accepted By:   Goldie Mayberry Closed?:    Time Spent (min): 5

## 2023-09-01 ENCOUNTER — CLINICAL DOCUMENTATION (OUTPATIENT)
Age: 33
End: 2023-09-01

## 2023-11-30 ENCOUNTER — CLINICAL DOCUMENTATION (OUTPATIENT)
Age: 33
End: 2023-11-30

## 2024-01-27 SDOH — ECONOMIC STABILITY: TRANSPORTATION INSECURITY
IN THE PAST 12 MONTHS, HAS LACK OF TRANSPORTATION KEPT YOU FROM MEETINGS, WORK, OR FROM GETTING THINGS NEEDED FOR DAILY LIVING?: NO

## 2024-01-27 SDOH — ECONOMIC STABILITY: HOUSING INSECURITY
IN THE LAST 12 MONTHS, WAS THERE A TIME WHEN YOU DID NOT HAVE A STEADY PLACE TO SLEEP OR SLEPT IN A SHELTER (INCLUDING NOW)?: NO

## 2024-01-27 SDOH — ECONOMIC STABILITY: FOOD INSECURITY: WITHIN THE PAST 12 MONTHS, YOU WORRIED THAT YOUR FOOD WOULD RUN OUT BEFORE YOU GOT MONEY TO BUY MORE.: NEVER TRUE

## 2024-01-27 SDOH — ECONOMIC STABILITY: INCOME INSECURITY: HOW HARD IS IT FOR YOU TO PAY FOR THE VERY BASICS LIKE FOOD, HOUSING, MEDICAL CARE, AND HEATING?: NOT VERY HARD

## 2024-01-27 SDOH — ECONOMIC STABILITY: FOOD INSECURITY: WITHIN THE PAST 12 MONTHS, THE FOOD YOU BOUGHT JUST DIDN'T LAST AND YOU DIDN'T HAVE MONEY TO GET MORE.: NEVER TRUE

## 2024-01-29 ENCOUNTER — OFFICE VISIT (OUTPATIENT)
Age: 34
End: 2024-01-29
Payer: COMMERCIAL

## 2024-01-29 VITALS
OXYGEN SATURATION: 98 % | HEART RATE: 86 BPM | RESPIRATION RATE: 16 BRPM | HEIGHT: 62 IN | BODY MASS INDEX: 44.72 KG/M2 | WEIGHT: 243 LBS | DIASTOLIC BLOOD PRESSURE: 73 MMHG | SYSTOLIC BLOOD PRESSURE: 123 MMHG | TEMPERATURE: 98 F

## 2024-01-29 DIAGNOSIS — E66.09 OBESITY DUE TO EXCESS CALORIES, UNSPECIFIED CLASSIFICATION, UNSPECIFIED WHETHER SERIOUS COMORBIDITY PRESENT: Primary | ICD-10-CM

## 2024-01-29 DIAGNOSIS — R35.0 FREQUENT URINATION: ICD-10-CM

## 2024-01-29 LAB
BILIRUBIN, URINE, POC: NEGATIVE
BLOOD URINE, POC: NEGATIVE
GLUCOSE URINE, POC: NEGATIVE
KETONES, URINE, POC: NEGATIVE
LEUKOCYTE ESTERASE, URINE, POC: NEGATIVE
NITRITE, URINE, POC: NEGATIVE
PH, URINE, POC: 6 (ref 4.6–8)
PROTEIN,URINE, POC: NEGATIVE
SPECIFIC GRAVITY, URINE, POC: 1.02 (ref 1–1.03)
URINALYSIS CLARITY, POC: CLEAR
URINALYSIS COLOR, POC: YELLOW
UROBILINOGEN, POC: NORMAL

## 2024-01-29 PROCEDURE — 81003 URINALYSIS AUTO W/O SCOPE: CPT | Performed by: FAMILY MEDICINE

## 2024-01-29 PROCEDURE — 99213 OFFICE O/P EST LOW 20 MIN: CPT | Performed by: FAMILY MEDICINE

## 2024-01-29 RX ORDER — SEMAGLUTIDE 1.34 MG/ML
INJECTION, SOLUTION SUBCUTANEOUS
Qty: 1.5 ML | Refills: 12 | Status: SHIPPED | OUTPATIENT
Start: 2024-01-29

## 2024-01-29 RX ORDER — TOPIRAMATE 50 MG/1
50 TABLET, FILM COATED ORAL
Qty: 90 TABLET | Refills: 1 | Status: SHIPPED | OUTPATIENT
Start: 2024-01-29

## 2024-01-29 RX ORDER — PHENTERMINE HYDROCHLORIDE 37.5 MG/1
37.5 TABLET ORAL
Qty: 30 TABLET | Refills: 5 | Status: SHIPPED | OUTPATIENT
Start: 2024-01-29 | End: 2024-07-27

## 2024-01-29 ASSESSMENT — PATIENT HEALTH QUESTIONNAIRE - PHQ9
SUM OF ALL RESPONSES TO PHQ QUESTIONS 1-9: 0
1. LITTLE INTEREST OR PLEASURE IN DOING THINGS: 0
SUM OF ALL RESPONSES TO PHQ QUESTIONS 1-9: 0

## 2024-01-29 NOTE — PROGRESS NOTES
Chief Complaint   Patient presents with    Medication Refill    Urinary Frequency         1. \"Have you been to the ER, urgent care clinic since your last visit?  Hospitalized since your last visit?\" [facility] - SORE THROAT -DECEMBER 2023    2. \"Have you seen or consulted any other health care providers outside of the [facility] since your last visit?\" NO     3. For patients aged 45-75: Has the patient had a colonoscopy / FIT/ Cologuard? N/A      If the patient is female:    4. For patients aged 40-74: Has the patient had a mammogram within the past 2 years? YES      5. For patients aged 21-65: Has the patient had a pap smear? PATIENT TO SCHEDULE.      Health Maintenance Due   Topic Date Due    Hepatitis B vaccine (1 of 3 - 3-dose series) Never done    Varicella vaccine (1 of 2 - 2-dose childhood series) Never done    Hepatitis C screen  Never done    HPV vaccine (2 - 3-dose series) 08/26/2010    Cervical cancer screen  Never done    COVID-19 Vaccine (4 - 2023-24 season) 09/01/2023

## 2024-01-29 NOTE — PROGRESS NOTES
Annie Doan (:  1990) is a 33 y.o. female,Established patient, here for evaluation of the following chief complaint(s):  Medication Refill and Urinary Frequency         ASSESSMENT/PLAN:  1. Obesity due to excess calories, unspecified classification, unspecified whether serious comorbidity present  -     phentermine (ADIPEX-P) 37.5 MG tablet; Take 1 tablet by mouth every morning (before breakfast) for 180 days. Max Daily Amount: 37.5 mg, Disp-30 tablet, R-5Normal  -     Hemoglobin A1C; Future  2. Frequent urination  -     AMB POC URINALYSIS DIP STICK AUTO W/O MICRO      No follow-ups on file.         Subjective   SUBJECTIVE/OBJECTIVE:  HPIhad lost 20 lbs on ozempic, then insurance stopped covering it. Otherwise doing well. Mother had been ill with cancer in , then lost weight , had her chemo changed , and doing better now. Has also been having some urinary frequency    Review of Systems       Objective   Physical Exam  Cardiovascular:      Rate and Rhythm: Normal rate and regular rhythm.      Heart sounds: Normal heart sounds. No murmur heard.  Pulmonary:      Effort: Pulmonary effort is normal.      Breath sounds: Normal breath sounds.   Abdominal:      General: Abdomen is flat. Bowel sounds are normal.      Palpations: Abdomen is soft.   Musculoskeletal:      Right lower leg: No edema.      Left lower leg: No edema.              An electronic signature was used to authenticate this note.    --Mykel Mullins MD

## 2024-01-30 LAB
EST. AVERAGE GLUCOSE BLD GHB EST-MCNC: 114 MG/DL
HBA1C MFR BLD: 5.6 % (ref 4–5.6)

## 2024-02-02 ENCOUNTER — TELEPHONE (OUTPATIENT)
Age: 34
End: 2024-02-02

## 2024-02-02 NOTE — TELEPHONE ENCOUNTER
Patient called about  a prior authorization needed Sexenda and Mounjaro both need a prior authorization. Patient would like to have the Mounjaro. Patient can be reached at [phone]. [id number]

## 2025-01-27 ENCOUNTER — TELEPHONE (OUTPATIENT)
Age: 35
End: 2025-01-27

## 2025-01-27 NOTE — TELEPHONE ENCOUNTER
Appointment for: Annie Doan ([id number])  Visit type: OFFICE VISIT (1004)  3/7/2025 10:20 AM (20 minutes) with Dr. Mykel Mullins MD in [facility]     Patient comments:  weight loss medication options

## 2025-03-06 SDOH — ECONOMIC STABILITY: FOOD INSECURITY: WITHIN THE PAST 12 MONTHS, YOU WORRIED THAT YOUR FOOD WOULD RUN OUT BEFORE YOU GOT MONEY TO BUY MORE.: NEVER TRUE

## 2025-03-06 SDOH — ECONOMIC STABILITY: FOOD INSECURITY: WITHIN THE PAST 12 MONTHS, THE FOOD YOU BOUGHT JUST DIDN'T LAST AND YOU DIDN'T HAVE MONEY TO GET MORE.: NEVER TRUE

## 2025-03-06 SDOH — ECONOMIC STABILITY: TRANSPORTATION INSECURITY
IN THE PAST 12 MONTHS, HAS THE LACK OF TRANSPORTATION KEPT YOU FROM MEDICAL APPOINTMENTS OR FROM GETTING MEDICATIONS?: NO

## 2025-03-06 SDOH — ECONOMIC STABILITY: INCOME INSECURITY: IN THE LAST 12 MONTHS, WAS THERE A TIME WHEN YOU WERE NOT ABLE TO PAY THE MORTGAGE OR RENT ON TIME?: NO

## 2025-03-07 ENCOUNTER — OFFICE VISIT (OUTPATIENT)
Age: 35
End: 2025-03-07
Payer: COMMERCIAL

## 2025-03-07 VITALS
BODY MASS INDEX: 46.33 KG/M2 | SYSTOLIC BLOOD PRESSURE: 117 MMHG | DIASTOLIC BLOOD PRESSURE: 64 MMHG | RESPIRATION RATE: 16 BRPM | TEMPERATURE: 97.8 F | HEIGHT: 62 IN | HEART RATE: 95 BPM | WEIGHT: 251.8 LBS

## 2025-03-07 DIAGNOSIS — E66.813 CLASS 3 SEVERE OBESITY WITH SERIOUS COMORBIDITY AND BODY MASS INDEX (BMI) OF 45.0 TO 49.9 IN ADULT, UNSPECIFIED OBESITY TYPE: ICD-10-CM

## 2025-03-07 DIAGNOSIS — E66.01 CLASS 3 SEVERE OBESITY WITH SERIOUS COMORBIDITY AND BODY MASS INDEX (BMI) OF 45.0 TO 49.9 IN ADULT, UNSPECIFIED OBESITY TYPE: Primary | ICD-10-CM

## 2025-03-07 DIAGNOSIS — E66.813 CLASS 3 SEVERE OBESITY WITH SERIOUS COMORBIDITY AND BODY MASS INDEX (BMI) OF 45.0 TO 49.9 IN ADULT, UNSPECIFIED OBESITY TYPE: Primary | ICD-10-CM

## 2025-03-07 DIAGNOSIS — E66.01 CLASS 3 SEVERE OBESITY WITH SERIOUS COMORBIDITY AND BODY MASS INDEX (BMI) OF 45.0 TO 49.9 IN ADULT, UNSPECIFIED OBESITY TYPE: ICD-10-CM

## 2025-03-07 LAB
ANION GAP SERPL CALC-SCNC: 6 MMOL/L (ref 2–12)
BUN SERPL-MCNC: 15 MG/DL (ref 6–20)
BUN/CREAT SERPL: 17 (ref 12–20)
CALCIUM SERPL-MCNC: 9.7 MG/DL (ref 8.5–10.1)
CHLORIDE SERPL-SCNC: 107 MMOL/L (ref 97–108)
CO2 SERPL-SCNC: 26 MMOL/L (ref 21–32)
CREAT SERPL-MCNC: 0.88 MG/DL (ref 0.55–1.02)
EST. AVERAGE GLUCOSE BLD GHB EST-MCNC: 114 MG/DL
GLUCOSE SERPL-MCNC: 92 MG/DL (ref 65–100)
HBA1C MFR BLD: 5.6 % (ref 4–5.6)
POTASSIUM SERPL-SCNC: 4.2 MMOL/L (ref 3.5–5.1)
SODIUM SERPL-SCNC: 139 MMOL/L (ref 136–145)

## 2025-03-07 PROCEDURE — 99213 OFFICE O/P EST LOW 20 MIN: CPT | Performed by: FAMILY MEDICINE

## 2025-03-07 NOTE — PROGRESS NOTES
Chief Complaint   Patient presents with    Weight Management       \"Have you been to the ER, urgent care clinic since your last visit?  Hospitalized since your last visit?\"    NO    “Have you seen or consulted any other health care providers outside of [facility] since your last visit?”      2024 possibly July   “Have you had a pap smear?”      Due in July?  Will fax result to pcp 24   No cervical cancer screening on file             Click Here for Release of Records Request       Vitals:    25 1058   BP: 117/64   Pulse: 95   Resp: 16   Temp: 97.8 °F (36.6 °C)      Health Maintenance Due   Topic Date Due    Varicella vaccine (1 of 2 - 13+ 2-dose series) Never done    Hepatitis C screen  Never done    Hepatitis B vaccine (1 of 3 - 19+ 3-dose series) Never done    HPV vaccine (2 - 3-dose series) 2010    Cervical cancer screen  Never done    Depression Screen  2025        The patient, Annie Doan, identity was verified by name and .

## 2025-03-07 NOTE — PROGRESS NOTES
Annie Doan (:  1990) is a 34 y.o. female,Established patient, here for evaluation of the following chief complaint(s):  Weight Management         Assessment & Plan  Class 3 severe obesity with serious comorbidity and body mass index (BMI) of 45.0 to 49.9 in adult, unspecified obesity type       Orders:    Semaglutide-Weight Management (WEGOVY) 0.5 MG/0.5ML SOAJ SC injection; 0.25 mg sc every 7 days for 4 weeks, then increase to 0.5 mg sc every 7 days.    Hemoglobin A1C; Future    Basic Metabolic Panel; Future  also suggested joining weight watchers. And taking the rx to them.     No follow-ups on file.       Subjective   HPI pt is trying to lose weight. Had lost 20 lbs on ozempic in 4 months, then insurance stopped covering it. Thinks was taking the 0.5 mg dose. No problems with polyuria, polydipsia. Tried the phjentermine/topamax combination. Lost a fe lbs, not nearly as much as on ozempic.     Review of Systems       Objective   Physical Exam  Cardiovascular:      Rate and Rhythm: Normal rate and regular rhythm.      Heart sounds: Normal heart sounds. No murmur heard.  Pulmonary:      Effort: Pulmonary effort is normal.      Breath sounds: Normal breath sounds.   Abdominal:      General: Abdomen is flat. Bowel sounds are normal.      Palpations: Abdomen is soft.   Musculoskeletal:      Right lower leg: No edema.      Left lower leg: No edema.                  An electronic signature was used to authenticate this note.    --Mykel Mullins MD

## 2025-03-19 ENCOUNTER — TELEPHONE (OUTPATIENT)
Age: 35
End: 2025-03-19

## 2025-03-19 NOTE — TELEPHONE ENCOUNTER
Pharmacy is unable to fill the Wegovy Rx as written. Please clarify. Prescribed pen can only administer 0.5mg, so the start of 0.25mg is not able to be administered. Please advise if this should only be written to use 0.5mg or if you want a separate Rx for the 0.25mg to start with?    Please advise

## 2025-03-20 ENCOUNTER — PATIENT MESSAGE (OUTPATIENT)
Age: 35
End: 2025-03-20

## 2025-03-20 NOTE — TELEPHONE ENCOUNTER
Pharmacy contacted and stated patient will need a new prescription for Wegovy 0.25mg weekly x 4 weeks. Patient can not have an order for Wegovy 0.5mg has to be 2 separate orders. Medication pended and routed to provider.

## 2025-03-20 NOTE — TELEPHONE ENCOUNTER
Brittani ( pharmacist ) Kwame would like to get clarification on  Semaglutide-Weight Management (WEGOVY) 0.5 MG/0.5ML SOAJ SC injection she can be reached @ [phone]

## 2025-03-25 ENCOUNTER — CLINICAL DOCUMENTATION (OUTPATIENT)
Age: 35
End: 2025-03-25

## 2025-04-09 ENCOUNTER — PATIENT MESSAGE (OUTPATIENT)
Age: 35
End: 2025-04-09

## 2025-04-11 ENCOUNTER — CLINICAL DOCUMENTATION (OUTPATIENT)
Age: 35
End: 2025-04-11

## 2025-04-14 ENCOUNTER — CLINICAL DOCUMENTATION (OUTPATIENT)
Age: 35
End: 2025-04-14

## 2025-04-23 ENCOUNTER — PATIENT MESSAGE (OUTPATIENT)
Age: 35
End: 2025-04-23

## 2025-06-10 ENCOUNTER — PATIENT MESSAGE (OUTPATIENT)
Age: 35
End: 2025-06-10

## 2025-06-23 ENCOUNTER — PATIENT MESSAGE (OUTPATIENT)
Age: 35
End: 2025-06-23

## 2025-06-26 ENCOUNTER — OFFICE VISIT (OUTPATIENT)
Age: 35
End: 2025-06-26
Payer: COMMERCIAL

## 2025-06-26 VITALS
DIASTOLIC BLOOD PRESSURE: 72 MMHG | RESPIRATION RATE: 16 BRPM | TEMPERATURE: 98.4 F | SYSTOLIC BLOOD PRESSURE: 125 MMHG | HEIGHT: 62 IN | BODY MASS INDEX: 46.59 KG/M2 | HEART RATE: 78 BPM | WEIGHT: 253.2 LBS

## 2025-06-26 DIAGNOSIS — F43.29 GRIEF REACTION WITH PROLONGED BEREAVEMENT: Primary | ICD-10-CM

## 2025-06-26 DIAGNOSIS — F32.1 CURRENT MODERATE EPISODE OF MAJOR DEPRESSIVE DISORDER, UNSPECIFIED WHETHER RECURRENT (HCC): ICD-10-CM

## 2025-06-26 PROCEDURE — 99213 OFFICE O/P EST LOW 20 MIN: CPT | Performed by: FAMILY MEDICINE

## 2025-06-26 ASSESSMENT — PATIENT HEALTH QUESTIONNAIRE - PHQ9
SUM OF ALL RESPONSES TO PHQ QUESTIONS 1-9: 14
5. POOR APPETITE OR OVEREATING: NEARLY EVERY DAY
7. TROUBLE CONCENTRATING ON THINGS, SUCH AS READING THE NEWSPAPER OR WATCHING TELEVISION: MORE THAN HALF THE DAYS
4. FEELING TIRED OR HAVING LITTLE ENERGY: NOT AT ALL
8. MOVING OR SPEAKING SO SLOWLY THAT OTHER PEOPLE COULD HAVE NOTICED. OR THE OPPOSITE, BEING SO FIGETY OR RESTLESS THAT YOU HAVE BEEN MOVING AROUND A LOT MORE THAN USUAL: NOT AT ALL
10. IF YOU CHECKED OFF ANY PROBLEMS, HOW DIFFICULT HAVE THESE PROBLEMS MADE IT FOR YOU TO DO YOUR WORK, TAKE CARE OF THINGS AT HOME, OR GET ALONG WITH OTHER PEOPLE: SOMEWHAT DIFFICULT
1. LITTLE INTEREST OR PLEASURE IN DOING THINGS: MORE THAN HALF THE DAYS
6. FEELING BAD ABOUT YOURSELF - OR THAT YOU ARE A FAILURE OR HAVE LET YOURSELF OR YOUR FAMILY DOWN: NEARLY EVERY DAY
3. TROUBLE FALLING OR STAYING ASLEEP: SEVERAL DAYS
SUM OF ALL RESPONSES TO PHQ QUESTIONS 1-9: 14
2. FEELING DOWN, DEPRESSED OR HOPELESS: NEARLY EVERY DAY
SUM OF ALL RESPONSES TO PHQ QUESTIONS 1-9: 14
9. THOUGHTS THAT YOU WOULD BE BETTER OFF DEAD, OR OF HURTING YOURSELF: NOT AT ALL
SUM OF ALL RESPONSES TO PHQ QUESTIONS 1-9: 14

## 2025-06-26 NOTE — PROGRESS NOTES
Annie Doan (:  1990) is a 34 y.o. female,Established patient, here for evaluation of the following chief complaint(s):  Anxiety, Depression, and Forms         Assessment & Plan  Grief reaction with prolonged bereavement       Orders:    sertraline (ZOLOFT) 50 MG tablet; Take 1 tablet by mouth daily    Current moderate episode of major depressive disorder, unspecified whether recurrent (HCC)       Orders:    sertraline (ZOLOFT) 50 MG tablet; Take 1 tablet by mouth daily  will keep pt off work until I recheck.     Return in about 4 weeks (around 2025).       Subjective   HPI mother   of uterine cancer. Was put on hospice in March. She was 63. Daughter lived with her, and was her primary caregiver while she was on hospice.   Pt having bad and good days. Sleeping reasonably well. Energy level is fair. Has been very irritable. Currently lives with son Woo, aged 7. Pt works from home,  having trouble focusing. Feels nervous and anxious in her house. Stayed in hotel the first week after mother . Gets very anxious when goes in certain rooms. Not currently taking any meds for stress. Not seeing a counselor at present. Thinks that counseling would help her. Has a friend that is a counselor and will help her arrange this. Worked on Tuesday, this was her first day back. Has trouble focusing on job. Doesn't think that can do her work at present.   Review of Systems       Objective   Physical Exam  Cardiovascular:      Rate and Rhythm: Normal rate and regular rhythm.      Heart sounds: Normal heart sounds. No murmur heard.  Pulmonary:      Effort: Pulmonary effort is normal.      Breath sounds: Normal breath sounds.   Abdominal:      General: Abdomen is flat. Bowel sounds are normal.      Palpations: Abdomen is soft.   Musculoskeletal:      Right lower leg: No edema.      Left lower leg: No edema.                  An electronic signature was used to authenticate this note.    --Mykel

## 2025-06-26 NOTE — PROGRESS NOTES
Chief Complaint   Patient presents with    Anxiety    Depression    Forms       \"Have you been to the ER, urgent care clinic since your last visit?  Hospitalized since your last visit?\"    NO    “Have you seen or consulted any other health care providers outside of [facility] since your last visit?”    NO     “Have you had a pap smear?”    NO - scheduled 10/01/25    No cervical cancer screening on file             Click Here for Release of Records Request       Vitals:    25 1154   BP: 125/72   Pulse: 78   Resp: 16   Temp: 98.4 °F (36.9 °C)      Health Maintenance Due   Topic Date Due    Varicella vaccine (1 of 2 - 13+ 2-dose series) Never done    Hepatitis C screen  Never done    Hepatitis B vaccine (1 of 3 - 19+ 3-dose series) Never done    HPV vaccine (2 - 3-dose series) 2010    Cervical cancer screen  Never done    Depression Screen  2025        The patient, Annie Doan, identity was verified by name and .

## (undated) DEVICE — PENCIL ES L3M BTTN SWCH S STL HEX LOK BLDE ELECTRD HOLSTER

## (undated) DEVICE — PACK PROCEDURE SURG C SECT KT SMH

## (undated) DEVICE — SOLIDIFIER MEDC 1200ML -- CONVERT TO 356117

## (undated) DEVICE — TIP CLEANER: Brand: VALLEYLAB

## (undated) DEVICE — MEDI-VAC NON-CONDUCTIVE SUCTION TUBING: Brand: CARDINAL HEALTH

## (undated) DEVICE — STAPLER SKIN SQ 30 ABSRB STPL DISP INSORB

## (undated) DEVICE — SUTURE VCRL SZ 0 L36IN ABSRB VLT L40MM CT 1/2 CIR J358H

## (undated) DEVICE — 3000CC GUARDIAN II: Brand: GUARDIAN

## (undated) DEVICE — (D)PREP SKN CHLRAPRP APPL 26ML -- CONVERT TO ITEM 371833

## (undated) DEVICE — SOLUTION IV 1000ML 0.9% SOD CHL

## (undated) DEVICE — POOLE SUCTION INSTRUMENT WITH REMOVABLE SHEATH: Brand: POOLE

## (undated) DEVICE — SUTURE VCRL SZ 2-0 L36IN ABSRB VLT L36MM CT-1 1/2 CIR J345H

## (undated) DEVICE — SUTURE MCRYL SZ 0 L36IN ABSRB VLT L48MM CTX 1/2 CIR Y398H

## (undated) DEVICE — LARGE, DISPOSABLE ALEXIS O C-SECTION PROTECTOR - RETRACTOR: Brand: ALEXIS ® O C-SECTION PROTECTOR - RETRACTOR

## (undated) DEVICE — REM POLYHESIVE ADULT PATIENT RETURN ELECTRODE: Brand: VALLEYLAB

## (undated) DEVICE — DEVON™ KNEE AND BODY STRAP 60" X 3" (1.5 M X 7.6 CM): Brand: DEVON